# Patient Record
Sex: MALE | Race: WHITE | Employment: OTHER | URBAN - METROPOLITAN AREA
[De-identification: names, ages, dates, MRNs, and addresses within clinical notes are randomized per-mention and may not be internally consistent; named-entity substitution may affect disease eponyms.]

---

## 2023-09-01 ENCOUNTER — HOSPITAL ENCOUNTER (OUTPATIENT)
Age: 63
End: 2023-09-01
Payer: COMMERCIAL

## 2023-09-01 ENCOUNTER — HOSPITAL ENCOUNTER (OUTPATIENT)
Dept: CT IMAGING | Age: 63
Discharge: HOME | End: 2023-09-01
Payer: COMMERCIAL

## 2023-09-01 DIAGNOSIS — R41.82 ALTERED MENTAL STATUS, UNSPECIFIED ALTERED MENTAL STATUS TYPE: ICD-10-CM

## 2023-09-01 PROCEDURE — 70450 CT HEAD/BRAIN W/O DYE: CPT

## 2024-03-08 ENCOUNTER — HOSPITAL ENCOUNTER (INPATIENT)
Age: 64
LOS: 11 days | Discharge: NURSING, REHAB OR LONG-TERM CARE | DRG: 304 | End: 2024-03-19
Attending: EMERGENCY MEDICINE | Admitting: INTERNAL MEDICINE
Payer: COMMERCIAL

## 2024-03-08 ENCOUNTER — APPOINTMENT (OUTPATIENT)
Dept: GENERAL RADIOLOGY | Age: 64
DRG: 304 | End: 2024-03-08
Payer: COMMERCIAL

## 2024-03-08 DIAGNOSIS — R07.9 CHEST PAIN, UNSPECIFIED TYPE: Primary | ICD-10-CM

## 2024-03-08 LAB
ANION GAP SERPL CALCULATED.3IONS-SCNC: 11 MMOL/L (ref 3–16)
BASOPHILS # BLD: 0 K/UL (ref 0–0.2)
BASOPHILS NFR BLD: 0.4 %
BILIRUB UR QL STRIP.AUTO: NEGATIVE
BUN SERPL-MCNC: 20 MG/DL (ref 7–20)
CALCIUM SERPL-MCNC: 9.5 MG/DL (ref 8.3–10.6)
CHLORIDE SERPL-SCNC: 93 MMOL/L (ref 99–110)
CLARITY UR: CLEAR
CO2 SERPL-SCNC: 25 MMOL/L (ref 21–32)
COLOR UR: YELLOW
CREAT SERPL-MCNC: 1.9 MG/DL (ref 0.8–1.3)
DEPRECATED RDW RBC AUTO: 15.2 % (ref 12.4–15.4)
EKG ATRIAL RATE: 73 BPM
EKG DIAGNOSIS: NORMAL
EKG P AXIS: 30 DEGREES
EKG P-R INTERVAL: 194 MS
EKG Q-T INTERVAL: 444 MS
EKG QRS DURATION: 108 MS
EKG QTC CALCULATION (BAZETT): 489 MS
EKG R AXIS: -1 DEGREES
EKG T AXIS: 134 DEGREES
EKG VENTRICULAR RATE: 73 BPM
EOSINOPHIL # BLD: 0.3 K/UL (ref 0–0.6)
EOSINOPHIL NFR BLD: 3.7 %
GFR SERPLBLD CREATININE-BSD FMLA CKD-EPI: 39 ML/MIN/{1.73_M2}
GLUCOSE SERPL-MCNC: 97 MG/DL (ref 70–99)
GLUCOSE UR STRIP.AUTO-MCNC: NEGATIVE MG/DL
HCT VFR BLD AUTO: 40.3 % (ref 40.5–52.5)
HGB BLD-MCNC: 14 G/DL (ref 13.5–17.5)
HGB UR QL STRIP.AUTO: NEGATIVE
KETONES UR STRIP.AUTO-MCNC: NEGATIVE MG/DL
LEUKOCYTE ESTERASE UR QL STRIP.AUTO: NEGATIVE
LYMPHOCYTES # BLD: 1.3 K/UL (ref 1–5.1)
LYMPHOCYTES NFR BLD: 18 %
MAGNESIUM SERPL-MCNC: 1.8 MG/DL (ref 1.8–2.4)
MCH RBC QN AUTO: 29.9 PG (ref 26–34)
MCHC RBC AUTO-ENTMCNC: 34.7 G/DL (ref 31–36)
MCV RBC AUTO: 86.2 FL (ref 80–100)
MONOCYTES # BLD: 0.6 K/UL (ref 0–1.3)
MONOCYTES NFR BLD: 8.6 %
NEUTROPHILS # BLD: 5 K/UL (ref 1.7–7.7)
NEUTROPHILS NFR BLD: 69.3 %
NITRITE UR QL STRIP.AUTO: NEGATIVE
PH UR STRIP.AUTO: 6.5 [PH] (ref 5–8)
PLATELET # BLD AUTO: 270 K/UL (ref 135–450)
PMV BLD AUTO: 7.6 FL (ref 5–10.5)
POTASSIUM SERPL-SCNC: 4.4 MMOL/L (ref 3.5–5.1)
PROT UR STRIP.AUTO-MCNC: 30 MG/DL
RBC # BLD AUTO: 4.67 M/UL (ref 4.2–5.9)
RBC #/AREA URNS HPF: NORMAL /HPF (ref 0–4)
SODIUM SERPL-SCNC: 129 MMOL/L (ref 136–145)
SP GR UR STRIP.AUTO: 1.01 (ref 1–1.03)
TROPONIN, HIGH SENSITIVITY: 25 NG/L (ref 0–22)
TROPONIN, HIGH SENSITIVITY: 31 NG/L (ref 0–22)
UA COMPLETE W REFLEX CULTURE PNL UR: ABNORMAL
UA DIPSTICK W REFLEX MICRO PNL UR: YES
URN SPEC COLLECT METH UR: ABNORMAL
UROBILINOGEN UR STRIP-ACNC: 0.2 E.U./DL
WBC # BLD AUTO: 7.2 K/UL (ref 4–11)
WBC #/AREA URNS HPF: NORMAL /HPF (ref 0–5)

## 2024-03-08 PROCEDURE — 85025 COMPLETE CBC W/AUTO DIFF WBC: CPT

## 2024-03-08 PROCEDURE — 84484 ASSAY OF TROPONIN QUANT: CPT

## 2024-03-08 PROCEDURE — 83735 ASSAY OF MAGNESIUM: CPT

## 2024-03-08 PROCEDURE — 81001 URINALYSIS AUTO W/SCOPE: CPT

## 2024-03-08 PROCEDURE — 36415 COLL VENOUS BLD VENIPUNCTURE: CPT

## 2024-03-08 PROCEDURE — 6370000000 HC RX 637 (ALT 250 FOR IP): Performed by: INTERNAL MEDICINE

## 2024-03-08 PROCEDURE — 80048 BASIC METABOLIC PNL TOTAL CA: CPT

## 2024-03-08 PROCEDURE — 71045 X-RAY EXAM CHEST 1 VIEW: CPT

## 2024-03-08 PROCEDURE — 2060000000 HC ICU INTERMEDIATE R&B

## 2024-03-08 PROCEDURE — 93005 ELECTROCARDIOGRAM TRACING: CPT | Performed by: EMERGENCY MEDICINE

## 2024-03-08 PROCEDURE — 99285 EMERGENCY DEPT VISIT HI MDM: CPT

## 2024-03-08 RX ORDER — VALSARTAN 160 MG/1
160 TABLET ORAL DAILY
Status: DISCONTINUED | OUTPATIENT
Start: 2024-03-09 | End: 2024-03-09

## 2024-03-08 RX ORDER — ACETAMINOPHEN 650 MG/1
650 SUPPOSITORY RECTAL EVERY 6 HOURS PRN
Status: DISCONTINUED | OUTPATIENT
Start: 2024-03-08 | End: 2024-03-19 | Stop reason: HOSPADM

## 2024-03-08 RX ORDER — LAMOTRIGINE 25 MG/1
TABLET ORAL
Status: ON HOLD | COMMUNITY
Start: 2024-03-18 | End: 2024-03-19 | Stop reason: HOSPADM

## 2024-03-08 RX ORDER — LAMOTRIGINE 25 MG/1
50 TABLET ORAL EVERY MORNING
Status: COMPLETED | OUTPATIENT
Start: 2024-03-09 | End: 2024-03-10

## 2024-03-08 RX ORDER — MAGNESIUM SULFATE IN WATER 40 MG/ML
2000 INJECTION, SOLUTION INTRAVENOUS PRN
Status: DISCONTINUED | OUTPATIENT
Start: 2024-03-08 | End: 2024-03-19 | Stop reason: HOSPADM

## 2024-03-08 RX ORDER — CLONIDINE 0.3 MG/24H
1 PATCH, EXTENDED RELEASE TRANSDERMAL WEEKLY
Status: ON HOLD | COMMUNITY
End: 2024-03-19 | Stop reason: HOSPADM

## 2024-03-08 RX ORDER — LAMOTRIGINE 25 MG/1
100 TABLET ORAL 2 TIMES DAILY
COMMUNITY
Start: 2024-03-25

## 2024-03-08 RX ORDER — AMLODIPINE BESYLATE 10 MG/1
10 TABLET ORAL DAILY
Status: DISCONTINUED | OUTPATIENT
Start: 2024-03-09 | End: 2024-03-11

## 2024-03-08 RX ORDER — ENOXAPARIN SODIUM 100 MG/ML
40 INJECTION SUBCUTANEOUS DAILY
Status: DISCONTINUED | OUTPATIENT
Start: 2024-03-09 | End: 2024-03-19 | Stop reason: HOSPADM

## 2024-03-08 RX ORDER — POTASSIUM CHLORIDE 20 MEQ/1
40 TABLET, EXTENDED RELEASE ORAL PRN
Status: DISCONTINUED | OUTPATIENT
Start: 2024-03-08 | End: 2024-03-19 | Stop reason: HOSPADM

## 2024-03-08 RX ORDER — GUAIFENESIN 600 MG/1
600 TABLET, EXTENDED RELEASE ORAL 2 TIMES DAILY PRN
COMMUNITY

## 2024-03-08 RX ORDER — BISACODYL 10 MG
10 SUPPOSITORY, RECTAL RECTAL DAILY PRN
Status: DISCONTINUED | OUTPATIENT
Start: 2024-03-08 | End: 2024-03-19 | Stop reason: HOSPADM

## 2024-03-08 RX ORDER — LANOLIN ALCOHOL/MO/W.PET/CERES
400 CREAM (GRAM) TOPICAL DAILY
COMMUNITY

## 2024-03-08 RX ORDER — LAMOTRIGINE 25 MG/1
75 TABLET ORAL 2 TIMES DAILY
Status: DISCONTINUED | OUTPATIENT
Start: 2024-03-11 | End: 2024-03-18

## 2024-03-08 RX ORDER — LANOLIN ALCOHOL/MO/W.PET/CERES
6 CREAM (GRAM) TOPICAL NIGHTLY PRN
COMMUNITY

## 2024-03-08 RX ORDER — LAMOTRIGINE 25 MG/1
TABLET ORAL SEE ADMIN INSTRUCTIONS
Status: ON HOLD | COMMUNITY
Start: 2024-03-04 | End: 2024-03-19 | Stop reason: HOSPADM

## 2024-03-08 RX ORDER — AMLODIPINE BESYLATE 10 MG/1
10 TABLET ORAL DAILY
Status: ON HOLD | COMMUNITY
Start: 2023-10-24 | End: 2024-03-19 | Stop reason: HOSPADM

## 2024-03-08 RX ORDER — KETOCONAZOLE 20 MG/G
CREAM TOPICAL
COMMUNITY

## 2024-03-08 RX ORDER — POLYETHYLENE GLYCOL 3350 17 G/17G
17 POWDER, FOR SOLUTION ORAL 2 TIMES DAILY PRN
COMMUNITY

## 2024-03-08 RX ORDER — LAMOTRIGINE 25 MG/1
75 TABLET ORAL 2 TIMES DAILY
Status: ON HOLD | COMMUNITY
Start: 2024-03-11 | End: 2024-03-19 | Stop reason: HOSPADM

## 2024-03-08 RX ORDER — TAMSULOSIN HYDROCHLORIDE 0.4 MG/1
0.4 CAPSULE ORAL DAILY
COMMUNITY

## 2024-03-08 RX ORDER — LORATADINE 10 MG/1
10 TABLET ORAL DAILY PRN
COMMUNITY

## 2024-03-08 RX ORDER — DULOXETIN HYDROCHLORIDE 30 MG/1
30 CAPSULE, DELAYED RELEASE ORAL DAILY
Status: DISCONTINUED | OUTPATIENT
Start: 2024-03-09 | End: 2024-03-19 | Stop reason: HOSPADM

## 2024-03-08 RX ORDER — ONDANSETRON 2 MG/ML
4 INJECTION INTRAMUSCULAR; INTRAVENOUS EVERY 6 HOURS PRN
Status: DISCONTINUED | OUTPATIENT
Start: 2024-03-08 | End: 2024-03-19 | Stop reason: HOSPADM

## 2024-03-08 RX ORDER — SODIUM CHLORIDE 9 MG/ML
INJECTION, SOLUTION INTRAVENOUS PRN
Status: DISCONTINUED | OUTPATIENT
Start: 2024-03-08 | End: 2024-03-19 | Stop reason: HOSPADM

## 2024-03-08 RX ORDER — ACETAMINOPHEN 325 MG/1
650 TABLET ORAL EVERY 4 HOURS PRN
COMMUNITY

## 2024-03-08 RX ORDER — CARVEDILOL 12.5 MG/1
12.5 TABLET ORAL 2 TIMES DAILY
COMMUNITY
Start: 2024-01-23

## 2024-03-08 RX ORDER — DULOXETIN HYDROCHLORIDE 30 MG/1
30 CAPSULE, DELAYED RELEASE ORAL DAILY
COMMUNITY

## 2024-03-08 RX ORDER — SODIUM CHLORIDE 0.9 % (FLUSH) 0.9 %
5-40 SYRINGE (ML) INJECTION EVERY 12 HOURS SCHEDULED
Status: DISCONTINUED | OUTPATIENT
Start: 2024-03-08 | End: 2024-03-19 | Stop reason: HOSPADM

## 2024-03-08 RX ORDER — DIPHENHYDRAMINE HCL 25 MG
25 TABLET ORAL DAILY PRN
COMMUNITY

## 2024-03-08 RX ORDER — POLYETHYLENE GLYCOL 3350 17 G/17G
17 POWDER, FOR SOLUTION ORAL DAILY PRN
Status: DISCONTINUED | OUTPATIENT
Start: 2024-03-08 | End: 2024-03-19 | Stop reason: HOSPADM

## 2024-03-08 RX ORDER — LOSARTAN POTASSIUM 100 MG/1
100 TABLET ORAL DAILY
Status: ON HOLD | COMMUNITY
End: 2024-03-19 | Stop reason: HOSPADM

## 2024-03-08 RX ORDER — SODIUM CHLORIDE 9 MG/ML
INJECTION, SOLUTION INTRAVENOUS CONTINUOUS
Status: DISCONTINUED | OUTPATIENT
Start: 2024-03-08 | End: 2024-03-09

## 2024-03-08 RX ORDER — HYDRALAZINE HYDROCHLORIDE 20 MG/ML
10 INJECTION INTRAMUSCULAR; INTRAVENOUS EVERY 6 HOURS PRN
Status: DISCONTINUED | OUTPATIENT
Start: 2024-03-08 | End: 2024-03-09

## 2024-03-08 RX ORDER — OXCARBAZEPINE 300 MG/1
300 TABLET, FILM COATED ORAL 2 TIMES DAILY
Status: ON HOLD | COMMUNITY
End: 2024-03-19 | Stop reason: HOSPADM

## 2024-03-08 RX ORDER — ACETAMINOPHEN 325 MG/1
650 TABLET ORAL EVERY 6 HOURS PRN
Status: DISCONTINUED | OUTPATIENT
Start: 2024-03-08 | End: 2024-03-19 | Stop reason: HOSPADM

## 2024-03-08 RX ORDER — SENNA AND DOCUSATE SODIUM 50; 8.6 MG/1; MG/1
2 TABLET, FILM COATED ORAL NIGHTLY
COMMUNITY

## 2024-03-08 RX ORDER — ALBUTEROL SULFATE 90 UG/1
2 AEROSOL, METERED RESPIRATORY (INHALATION) EVERY 6 HOURS PRN
COMMUNITY
Start: 2023-11-24

## 2024-03-08 RX ORDER — THIAMINE MONONITRATE (VIT B1) 100 MG
100 TABLET ORAL DAILY
COMMUNITY

## 2024-03-08 RX ORDER — ERGOCALCIFEROL 1.25 MG/1
50000 CAPSULE ORAL WEEKLY
COMMUNITY

## 2024-03-08 RX ORDER — BISACODYL 10 MG
10 SUPPOSITORY, RECTAL RECTAL DAILY PRN
COMMUNITY
Start: 2024-02-16 | End: 2024-03-08

## 2024-03-08 RX ORDER — LAMOTRIGINE 25 MG/1
75 TABLET ORAL NIGHTLY
Status: DISPENSED | OUTPATIENT
Start: 2024-03-08 | End: 2024-03-11

## 2024-03-08 RX ORDER — ALBUTEROL SULFATE 2.5 MG/3ML
2.5 SOLUTION RESPIRATORY (INHALATION) EVERY 4 HOURS PRN
Status: DISCONTINUED | OUTPATIENT
Start: 2024-03-08 | End: 2024-03-19 | Stop reason: HOSPADM

## 2024-03-08 RX ORDER — ONDANSETRON 4 MG/1
4 TABLET, ORALLY DISINTEGRATING ORAL EVERY 8 HOURS PRN
Status: DISCONTINUED | OUTPATIENT
Start: 2024-03-08 | End: 2024-03-19 | Stop reason: HOSPADM

## 2024-03-08 RX ORDER — POTASSIUM CHLORIDE 7.45 MG/ML
10 INJECTION INTRAVENOUS PRN
Status: DISCONTINUED | OUTPATIENT
Start: 2024-03-08 | End: 2024-03-19 | Stop reason: HOSPADM

## 2024-03-08 RX ORDER — CARVEDILOL 12.5 MG/1
12.5 TABLET ORAL 2 TIMES DAILY
Status: DISCONTINUED | OUTPATIENT
Start: 2024-03-08 | End: 2024-03-09

## 2024-03-08 RX ORDER — CEPHALEXIN 250 MG/1
250 CAPSULE ORAL DAILY
Status: ON HOLD | COMMUNITY
Start: 2024-03-05 | End: 2024-03-19 | Stop reason: HOSPADM

## 2024-03-08 RX ORDER — FERROUS FUMARATE 324(106)MG
1 TABLET ORAL DAILY
COMMUNITY

## 2024-03-08 RX ORDER — SODIUM CHLORIDE 0.9 % (FLUSH) 0.9 %
5-40 SYRINGE (ML) INJECTION PRN
Status: DISCONTINUED | OUTPATIENT
Start: 2024-03-08 | End: 2024-03-19 | Stop reason: HOSPADM

## 2024-03-08 RX ADMIN — LAMOTRIGINE 75 MG: 25 TABLET ORAL at 23:44

## 2024-03-08 RX ADMIN — CARVEDILOL 12.5 MG: 12.5 TABLET, FILM COATED ORAL at 23:45

## 2024-03-08 ASSESSMENT — PAIN DESCRIPTION - ORIENTATION
ORIENTATION: MID
ORIENTATION: MID

## 2024-03-08 ASSESSMENT — PAIN DESCRIPTION - LOCATION
LOCATION: CHEST
LOCATION: CHEST

## 2024-03-08 ASSESSMENT — PAIN SCALES - GENERAL
PAINLEVEL_OUTOF10: 4
PAINLEVEL_OUTOF10: 6
PAINLEVEL_OUTOF10: 0

## 2024-03-08 ASSESSMENT — PAIN DESCRIPTION - DESCRIPTORS: DESCRIPTORS: ACHING

## 2024-03-08 NOTE — H&P
more flushed today with his face being red than usual, he had a systolic blood pressure at times up to 170 today and she had noticed recently that there is a good 20 point difference between his left and right upper extremity when checking his blood pressure.  Seen patient has been eating and drinking okay.  He denied having any nausea no vomiting no worsening of the weakness in the right upper extremity/lower extremity.  No urinary symptoms.    Patient is a full code.    According to notes from [facility] patient is allergic to Eliquis, aspirin, and therapeutic heparin due to concern of CAA but able to take heparin for DVT prophylaxis.    Also his list from the nursing home for acute medication need to be verified.    Assessment/Plan:      Current Principal Problem:  Chest pain    1.  Chest pain questionable if is related to worsening blood pressure, according to the wife supposed to be followed with cardiology as an outpatient, Blood pressure still mildly elevated so we will proceed with resuming home medication, troponin was 30 1 repeat was 25, patient symptoms has resolved by the time he was evaluated.  Will consult cardiology for evaluation as he may benefit from stress test but would like to control blood pressure first.  2.  Hypertension which continues to be an issue as blood pressure continue to be elevated continue with Coreg 12.5 mg oral twice a day, Norvasc 10 mg daily, patient is on clonidine patch 0.3 mg once a week, also on valsartan 160 mg daily.  3.  Benign prostate hypertrophy continue with Flomax 0.4 mg daily.  4.  Focal seizure patient is supposed to be on Lamictal which she is supposed to take at 50 mg every morning and 75 mg nightly through 3/10/2024 then he is to be increased to 75 mg oral twice a day through 3/17/2024, THEN 75 mg qAM and 100 mg qHS through 3/24/24; THEN switch to 100 mg tab BID starting 3/25/24.  According to the discharge instruction from his recent hospitalization at

## 2024-03-08 NOTE — ED PROVIDER NOTES
THE [facility]  EMERGENCY DEPARTMENT ENCOUNTER          EM RESIDENT NOTE       Date of evaluation: 3/8/2024    Chief Complaint     Chest Pain (CP started this morning, elevated BP. @ baseline mental status )      History of Present Illness     Joe Wood is a 64 y.o. male with story of atraumatic ICH with right-sided residual deficits, subsequent seizure disorder, atrial fibrillation, recurrent UTI who presents with chest pain from assisted living facility.    Patient presents with concern of central chest pain that began this morning while completing physical therapy exercises.  He states his pain is substernal in location and has not radiated since onset but has been constant.  He describes the pain as sharp and is not associate with shortness of breath, nausea, vomiting, lightheadedness, or any other significant symptoms.  He denies any new weakness, numbness, or tingling to any of his extremities and has not had any recent cough, congestion, infectious symptoms.  He has not taken any medications for symptoms since onset today.  Patient was reportedly hypertensive to the 200s/100s just prior to transport with EMS.  He rates his pain currently at a 6/10 in severity.    MEDICAL DECISION MAKING / ASSESSMENT / PLAN     INITIAL VITALS: BP: (!) 188/109, Temp: 97.8 °F (36.6 °C), Pulse: 82, Respirations: 18, SpO2: 98 %    Joe Wood is a 64 y.o. male with history of significant cardiovascular disease presenting for substernal chest pain.      No acute distress on initial evaluation and cardiopulmonary exam is reassuring without tachycardia or hypotension. Pulses equal and strong throughout extremities.  His examination is reassuring and his initial workup is also reassuring against acute ischemia.  His EKG demonstrates lateral T wave inversions which are persistent from previous EKGs and have no other signs of acute ischemia.  Additionally given the patient's presentation I have low concern for PE or

## 2024-03-08 NOTE — ED PROVIDER NOTES
ED Attending Attestation Note     Date of evaluation: 3/8/2024    This patient was seen by the resident.  I have seen and examined the patient, agree with the workup, evaluation, management and diagnosis. The care plan has been discussed.  I have reviewed the ECG and concur with the resident's interpretation.  My assessment reveals awake alert male who apparently had chest pain earlier today.  Currently is not present.  Not short of breath not pleuritic in nature he is awake alert no respiratory distress no tenderness in the chest wall.      Rubin Avila MD  03/08/24 6727

## 2024-03-08 NOTE — ED NOTES
Patient BIBA from [facility] for chest pain.  Patient reports that chest pain started this morning during exercise.  PIV placed using aseptic technique per hospital policy.  Blood collected and sent to lab.  EKG completed and patient placed on monitor     Aida Hahn RN  03/08/24 6639

## 2024-03-08 NOTE — ED NOTES
Patient cleaned of urine and stool then placed on PureWick.       Aida Hahn, SABINO  03/08/24 8474

## 2024-03-09 ENCOUNTER — APPOINTMENT (OUTPATIENT)
Dept: GENERAL RADIOLOGY | Age: 64
DRG: 304 | End: 2024-03-09
Payer: COMMERCIAL

## 2024-03-09 LAB
ALBUMIN SERPL-MCNC: 4.1 G/DL (ref 3.4–5)
ALBUMIN/GLOB SERPL: 1.2 {RATIO} (ref 1.1–2.2)
ALP SERPL-CCNC: 77 U/L (ref 40–129)
ALT SERPL-CCNC: 14 U/L (ref 10–40)
ANION GAP SERPL CALCULATED.3IONS-SCNC: 12 MMOL/L (ref 3–16)
ANION GAP SERPL CALCULATED.3IONS-SCNC: 13 MMOL/L (ref 3–16)
AST SERPL-CCNC: 15 U/L (ref 15–37)
BASOPHILS # BLD: 0 K/UL (ref 0–0.2)
BASOPHILS NFR BLD: 0.3 %
BILIRUB SERPL-MCNC: 0.4 MG/DL (ref 0–1)
BILIRUB UR QL STRIP.AUTO: NEGATIVE
BUN SERPL-MCNC: 18 MG/DL (ref 7–20)
BUN SERPL-MCNC: 19 MG/DL (ref 7–20)
CALCIUM SERPL-MCNC: 9.3 MG/DL (ref 8.3–10.6)
CALCIUM SERPL-MCNC: 9.4 MG/DL (ref 8.3–10.6)
CHLORIDE SERPL-SCNC: 93 MMOL/L (ref 99–110)
CHLORIDE SERPL-SCNC: 96 MMOL/L (ref 99–110)
CLARITY UR: CLEAR
CO2 SERPL-SCNC: 24 MMOL/L (ref 21–32)
CO2 SERPL-SCNC: 25 MMOL/L (ref 21–32)
COLOR UR: YELLOW
CREAT SERPL-MCNC: 1.9 MG/DL (ref 0.8–1.3)
CREAT SERPL-MCNC: 1.9 MG/DL (ref 0.8–1.3)
DEPRECATED RDW RBC AUTO: 14.8 % (ref 12.4–15.4)
DEPRECATED RDW RBC AUTO: 15.1 % (ref 12.4–15.4)
EOSINOPHIL # BLD: 0.3 K/UL (ref 0–0.6)
EOSINOPHIL NFR BLD: 3.7 %
GFR SERPLBLD CREATININE-BSD FMLA CKD-EPI: 39 ML/MIN/{1.73_M2}
GFR SERPLBLD CREATININE-BSD FMLA CKD-EPI: 39 ML/MIN/{1.73_M2}
GLUCOSE BLD-MCNC: 106 MG/DL (ref 70–99)
GLUCOSE SERPL-MCNC: 101 MG/DL (ref 70–99)
GLUCOSE SERPL-MCNC: 107 MG/DL (ref 70–99)
GLUCOSE UR STRIP.AUTO-MCNC: NEGATIVE MG/DL
HCT VFR BLD AUTO: 39.6 % (ref 40.5–52.5)
HCT VFR BLD AUTO: 43.5 % (ref 40.5–52.5)
HGB BLD-MCNC: 13.6 G/DL (ref 13.5–17.5)
HGB BLD-MCNC: 14.5 G/DL (ref 13.5–17.5)
HGB UR QL STRIP.AUTO: NEGATIVE
KETONES UR STRIP.AUTO-MCNC: NEGATIVE MG/DL
LEUKOCYTE ESTERASE UR QL STRIP.AUTO: NEGATIVE
LYMPHOCYTES # BLD: 1.2 K/UL (ref 1–5.1)
LYMPHOCYTES NFR BLD: 17.4 %
MCH RBC QN AUTO: 28.9 PG (ref 26–34)
MCH RBC QN AUTO: 29.5 PG (ref 26–34)
MCHC RBC AUTO-ENTMCNC: 33.4 G/DL (ref 31–36)
MCHC RBC AUTO-ENTMCNC: 34.3 G/DL (ref 31–36)
MCV RBC AUTO: 85.9 FL (ref 80–100)
MCV RBC AUTO: 86.5 FL (ref 80–100)
MONOCYTES # BLD: 0.6 K/UL (ref 0–1.3)
MONOCYTES NFR BLD: 8.9 %
NEUTROPHILS # BLD: 5 K/UL (ref 1.7–7.7)
NEUTROPHILS NFR BLD: 69.7 %
NITRITE UR QL STRIP.AUTO: NEGATIVE
PERFORMED ON: ABNORMAL
PH UR STRIP.AUTO: 7.5 [PH] (ref 5–8)
PLATELET # BLD AUTO: 257 K/UL (ref 135–450)
PLATELET # BLD AUTO: 282 K/UL (ref 135–450)
PMV BLD AUTO: 7.6 FL (ref 5–10.5)
PMV BLD AUTO: 7.7 FL (ref 5–10.5)
POTASSIUM SERPL-SCNC: 4.2 MMOL/L (ref 3.5–5.1)
POTASSIUM SERPL-SCNC: 4.6 MMOL/L (ref 3.5–5.1)
PROCALCITONIN SERPL IA-MCNC: 0.07 NG/ML (ref 0–0.15)
PROT SERPL-MCNC: 7.5 G/DL (ref 6.4–8.2)
PROT UR STRIP.AUTO-MCNC: 30 MG/DL
RBC # BLD AUTO: 4.61 M/UL (ref 4.2–5.9)
RBC # BLD AUTO: 5.03 M/UL (ref 4.2–5.9)
RBC #/AREA URNS HPF: NORMAL /HPF (ref 0–4)
SODIUM SERPL-SCNC: 130 MMOL/L (ref 136–145)
SODIUM SERPL-SCNC: 133 MMOL/L (ref 136–145)
SP GR UR STRIP.AUTO: 1.01 (ref 1–1.03)
UA COMPLETE W REFLEX CULTURE PNL UR: ABNORMAL
UA DIPSTICK W REFLEX MICRO PNL UR: YES
URN SPEC COLLECT METH UR: ABNORMAL
UROBILINOGEN UR STRIP-ACNC: 0.2 E.U./DL
WBC # BLD AUTO: 7.2 K/UL (ref 4–11)
WBC # BLD AUTO: 8.9 K/UL (ref 4–11)
WBC #/AREA URNS HPF: NORMAL /HPF (ref 0–5)

## 2024-03-09 PROCEDURE — 94640 AIRWAY INHALATION TREATMENT: CPT

## 2024-03-09 PROCEDURE — 6360000002 HC RX W HCPCS: Performed by: NURSE PRACTITIONER

## 2024-03-09 PROCEDURE — 85027 COMPLETE CBC AUTOMATED: CPT

## 2024-03-09 PROCEDURE — 36415 COLL VENOUS BLD VENIPUNCTURE: CPT

## 2024-03-09 PROCEDURE — 6360000002 HC RX W HCPCS: Performed by: INTERNAL MEDICINE

## 2024-03-09 PROCEDURE — 6370000000 HC RX 637 (ALT 250 FOR IP): Performed by: INTERNAL MEDICINE

## 2024-03-09 PROCEDURE — 99223 1ST HOSP IP/OBS HIGH 75: CPT | Performed by: INTERNAL MEDICINE

## 2024-03-09 PROCEDURE — 85025 COMPLETE CBC W/AUTO DIFF WBC: CPT

## 2024-03-09 PROCEDURE — 84145 PROCALCITONIN (PCT): CPT

## 2024-03-09 PROCEDURE — 2060000000 HC ICU INTERMEDIATE R&B

## 2024-03-09 PROCEDURE — 81001 URINALYSIS AUTO W/SCOPE: CPT

## 2024-03-09 PROCEDURE — 80053 COMPREHEN METABOLIC PANEL: CPT

## 2024-03-09 PROCEDURE — 71045 X-RAY EXAM CHEST 1 VIEW: CPT

## 2024-03-09 PROCEDURE — 74018 RADEX ABDOMEN 1 VIEW: CPT

## 2024-03-09 PROCEDURE — 6370000000 HC RX 637 (ALT 250 FOR IP): Performed by: STUDENT IN AN ORGANIZED HEALTH CARE EDUCATION/TRAINING PROGRAM

## 2024-03-09 PROCEDURE — 2580000003 HC RX 258: Performed by: INTERNAL MEDICINE

## 2024-03-09 RX ORDER — VALSARTAN 320 MG/1
320 TABLET ORAL DAILY
Status: DISCONTINUED | OUTPATIENT
Start: 2024-03-10 | End: 2024-03-11

## 2024-03-09 RX ORDER — VALSARTAN 160 MG/1
160 TABLET ORAL ONCE
Status: COMPLETED | OUTPATIENT
Start: 2024-03-09 | End: 2024-03-09

## 2024-03-09 RX ORDER — HYDRALAZINE HYDROCHLORIDE 20 MG/ML
20 INJECTION INTRAMUSCULAR; INTRAVENOUS EVERY 4 HOURS PRN
Status: DISCONTINUED | OUTPATIENT
Start: 2024-03-09 | End: 2024-03-19 | Stop reason: HOSPADM

## 2024-03-09 RX ORDER — CARVEDILOL 25 MG/1
25 TABLET ORAL 2 TIMES DAILY
Status: DISCONTINUED | OUTPATIENT
Start: 2024-03-09 | End: 2024-03-19 | Stop reason: HOSPADM

## 2024-03-09 RX ORDER — CLONIDINE 0.2 MG/24H
1 PATCH, EXTENDED RELEASE TRANSDERMAL WEEKLY
Status: DISCONTINUED | OUTPATIENT
Start: 2024-03-09 | End: 2024-03-19 | Stop reason: HOSPADM

## 2024-03-09 RX ORDER — PROMETHAZINE HYDROCHLORIDE 25 MG/ML
6.25 INJECTION, SOLUTION INTRAMUSCULAR; INTRAVENOUS ONCE
Status: COMPLETED | OUTPATIENT
Start: 2024-03-09 | End: 2024-03-09

## 2024-03-09 RX ADMIN — HYDRALAZINE HYDROCHLORIDE 10 MG: 20 INJECTION INTRAMUSCULAR; INTRAVENOUS at 12:46

## 2024-03-09 RX ADMIN — HYDRALAZINE HYDROCHLORIDE 20 MG: 20 INJECTION INTRAMUSCULAR; INTRAVENOUS at 17:42

## 2024-03-09 RX ADMIN — PROMETHAZINE HYDROCHLORIDE 6.25 MG: 25 INJECTION INTRAMUSCULAR; INTRAVENOUS at 19:00

## 2024-03-09 RX ADMIN — POLYETHYLENE GLYCOL 3350 17 G: 17 POWDER, FOR SOLUTION ORAL at 09:07

## 2024-03-09 RX ADMIN — SODIUM CHLORIDE, PRESERVATIVE FREE 10 ML: 5 INJECTION INTRAVENOUS at 20:12

## 2024-03-09 RX ADMIN — DULOXETINE HYDROCHLORIDE 30 MG: 30 CAPSULE, DELAYED RELEASE ORAL at 08:26

## 2024-03-09 RX ADMIN — VALSARTAN 160 MG: 160 TABLET, FILM COATED ORAL at 08:26

## 2024-03-09 RX ADMIN — CARVEDILOL 12.5 MG: 12.5 TABLET, FILM COATED ORAL at 08:26

## 2024-03-09 RX ADMIN — ENOXAPARIN SODIUM 40 MG: 100 INJECTION SUBCUTANEOUS at 08:25

## 2024-03-09 RX ADMIN — VALSARTAN 160 MG: 160 TABLET, FILM COATED ORAL at 13:15

## 2024-03-09 RX ADMIN — SODIUM CHLORIDE, PRESERVATIVE FREE 10 ML: 5 INJECTION INTRAVENOUS at 00:46

## 2024-03-09 RX ADMIN — ONDANSETRON 4 MG: 2 INJECTION INTRAMUSCULAR; INTRAVENOUS at 12:46

## 2024-03-09 RX ADMIN — AMLODIPINE BESYLATE 10 MG: 10 TABLET ORAL at 08:26

## 2024-03-09 RX ADMIN — VALSARTAN 160 MG: 160 TABLET, FILM COATED ORAL at 10:30

## 2024-03-09 RX ADMIN — ALBUTEROL SULFATE 2.5 MG: 2.5 SOLUTION RESPIRATORY (INHALATION) at 00:07

## 2024-03-09 RX ADMIN — LAMOTRIGINE 50 MG: 25 TABLET ORAL at 08:26

## 2024-03-09 RX ADMIN — SODIUM CHLORIDE: 9 INJECTION, SOLUTION INTRAVENOUS at 00:45

## 2024-03-09 ASSESSMENT — PAIN - FUNCTIONAL ASSESSMENT: PAIN_FUNCTIONAL_ASSESSMENT: PREVENTS OR INTERFERES WITH ALL ACTIVE AND SOME PASSIVE ACTIVITIES

## 2024-03-09 ASSESSMENT — PAIN SCALES - GENERAL
PAINLEVEL_OUTOF10: 0
PAINLEVEL_OUTOF10: 10
PAINLEVEL_OUTOF10: 0
PAINLEVEL_OUTOF10: 0

## 2024-03-09 ASSESSMENT — PAIN DESCRIPTION - FREQUENCY: FREQUENCY: CONTINUOUS

## 2024-03-09 ASSESSMENT — PAIN DESCRIPTION - LOCATION: LOCATION: ABDOMEN

## 2024-03-09 ASSESSMENT — PAIN DESCRIPTION - PAIN TYPE: TYPE: ACUTE PAIN

## 2024-03-09 ASSESSMENT — PAIN DESCRIPTION - ONSET: ONSET: ON-GOING

## 2024-03-09 ASSESSMENT — PAIN DESCRIPTION - ORIENTATION: ORIENTATION: ANTERIOR

## 2024-03-09 NOTE — ED NOTES
ED TO INPATIENT SBAR HANDOFF    Patient Name: Joe Wood   :  1960  64 y.o.   MRN:  [id number]  Preferred Name  Shaniko  ED Room #:  A09/A09-09  Family/Caregiver Present no   Restraints no   Sitter no   Sepsis Risk Score Sepsis Risk Score: 0.53    Situation  Code Status: No Order No additional code details.    Allergies: Patient has no allergy information on record.  Weight: Patient Vitals for the past 96 hrs (Last 3 readings):   Weight   24 1600 84.2 kg (185 lb 9.6 oz)     Arrived from: nursing home  Chief Complaint:   Chief Complaint   Patient presents with    Chest Pain     CP started this morning, elevated BP. @ baseline mental status      Hospital Problem/Diagnosis:  Principal Problem:    Chest pain  Resolved Problems:    * No resolved hospital problems. *    Imaging:   XR CHEST PORTABLE   Final Result      1.  No acute disease.           Abnormal labs:   Abnormal Labs Reviewed   CBC WITH AUTO DIFFERENTIAL - Abnormal; Notable for the following components:       Result Value    Hematocrit 40.3 (*)     All other components within normal limits   BASIC METABOLIC PANEL W/ REFLEX TO MG FOR LOW K - Abnormal; Notable for the following components:    Sodium 129 (*)     Chloride 93 (*)     Creatinine 1.9 (*)     Est, Glom Filt Rate 39 (*)     All other components within normal limits   TROPONIN - Abnormal; Notable for the following components:    Troponin, High Sensitivity 31 (*)     All other components within normal limits   TROPONIN - Abnormal; Notable for the following components:    Troponin, High Sensitivity 25 (*)     All other components within normal limits   URINALYSIS WITH REFLEX TO CULTURE - Abnormal; Notable for the following components:    Protein, UA 30 (*)     All other components within normal limits     Critical values: no     Abnormal Assessment Findings: Patient hypertensive 170's.  Complains of mid sternal chest pain.  Flushed face    Background  History: History reviewed. No pertinent

## 2024-03-09 NOTE — CONSULTS
[facility]  Cardiology Inpatient Consult Service                                                                                          Pt Name: Joe Wood  Age: 64 y.o.  Sex: male  : 1960  Location: [address] [place], [place] [address]    Referring Physician: Paul Velasquez MD      Reason for Consult:       Reason for Consultation/Chief Complaint:   Chest pain/uncontrolled hypertension      HPI:      Joe Wood is a 64 y.o. male with complex past history including recent stroke/hemorrhagic conversion complicated with seizure disorder, uncontrolled hypertension who presented to hospital with chest discomfort.  It seems that last outpatient they have been struggling to tailor his BP meds.  His EKG does not show any ischemic changes, does have LVH with repolarization changes, troponins currently trending down    Histories     Past Medical History:   has no past medical history on file.    Surgical History:   has no past surgical history on file.     Social History:   reports that he has never smoked. He has never used smokeless tobacco.     Family History:  No evidence for sudden cardiac death or premature CAD      Medications:       Home Medications  Were reviewed and are listed in nursing record. and/or listed below  Prior to Admission medications    Medication Sig Start Date End Date Taking? Authorizing Provider   Cyanocobalamin 1000 MCG/ML KIT Inject 1,000 mcg into the muscle every 30 days 10/24/23  Yes Provider, MD Mere   albuterol sulfate HFA (PROVENTIL;VENTOLIN;PROAIR) 108 (90 Base) MCG/ACT inhaler Inhale 2 puffs into the lungs every 6 hours as needed for Wheezing 23  Yes Provider, MD Mere   carvedilol (COREG) 12.5 MG tablet Take 1 tablet by mouth 2 times daily 24  Yes ProviderMere MD   amLODIPine (NORVASC) 10 MG tablet Take 1 tablet by mouth daily 10/24/23  Yes Provider, MD Mere   acetaminophen (TYLENOL) 325 MG tablet Take 2 tablets by

## 2024-03-09 NOTE — CONSULTS
Ph: [phone], Fax: [phone]                                     [facility]                                                   [address] [place], [place] [id number]           Reason for admission:    Chest pain           Brief Summary:     Joe Wood is being seen by nephrology for CKD and HTN    Interval History and Plan:   Patient seen at bedside  Comfortable  /95  On room air      Reviewed cardiologist recommendations. Agree. Increase ARB dose.   Follow BP  Given elevated BP, D/C IV normal saline.   Neurology consulted for ? Confusion from his baseline per his wife.   UA neg for UTI  Increase protein in diet  Avoid NSAIDs/Nephrotoxins            Thank you for allowing us to participate in this patient's care  In case of any question please call us at our 24 hour answering service [phone] or from 7 AM to 5 PM via Perfect Serve, Voalte, Epic chat or cell phone.   Dr Chau Anders MD      Assessment:     Chronic Kidney Disease  Cr around baseline      Mild Hyponatremia      Hypertension:      Chest pain      H/O CVA        HPI      Patient is a 64 y.o. 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       Patient seen at bedside with his wife and appear comfortable. Unable to speak properly due to H/O CVA. Chest pain is better. Denied SOB and on room air. Per wife patient appear little confused ? And he has recurrent UTIs and whenever he has UTI he is more confused.         ROS:   Negative except as mentioned in HPI      Vitals:     Vitals:    03/09/24 0855   BP: (!) 179/95   Pulse: 80   Resp: 18   Temp: 98.1 °F (36.7 °C)   SpO2:          Physical Examination:     General appearance: NAD. Alert,

## 2024-03-10 LAB
ALBUMIN SERPL-MCNC: 4 G/DL (ref 3.4–5)
ALBUMIN/GLOB SERPL: 1.3 {RATIO} (ref 1.1–2.2)
ALP SERPL-CCNC: 75 U/L (ref 40–129)
ALT SERPL-CCNC: 12 U/L (ref 10–40)
ANION GAP SERPL CALCULATED.3IONS-SCNC: 11 MMOL/L (ref 3–16)
AST SERPL-CCNC: 15 U/L (ref 15–37)
BILIRUB SERPL-MCNC: 0.6 MG/DL (ref 0–1)
BUN SERPL-MCNC: 19 MG/DL (ref 7–20)
CALCIUM SERPL-MCNC: 9.4 MG/DL (ref 8.3–10.6)
CHLORIDE SERPL-SCNC: 97 MMOL/L (ref 99–110)
CO2 SERPL-SCNC: 25 MMOL/L (ref 21–32)
CREAT SERPL-MCNC: 2.2 MG/DL (ref 0.8–1.3)
D DIMER: 0.4 UG/ML FEU (ref 0–0.6)
FLUAV RNA UPPER RESP QL NAA+PROBE: NEGATIVE
FLUBV AG NPH QL: NEGATIVE
GFR SERPLBLD CREATININE-BSD FMLA CKD-EPI: 33 ML/MIN/{1.73_M2}
GLUCOSE SERPL-MCNC: 105 MG/DL (ref 70–99)
POTASSIUM SERPL-SCNC: 4.4 MMOL/L (ref 3.5–5.1)
PROT SERPL-MCNC: 7.1 G/DL (ref 6.4–8.2)
SARS-COV-2 RDRP RESP QL NAA+PROBE: NOT DETECTED
SODIUM SERPL-SCNC: 133 MMOL/L (ref 136–145)

## 2024-03-10 PROCEDURE — 6370000000 HC RX 637 (ALT 250 FOR IP): Performed by: STUDENT IN AN ORGANIZED HEALTH CARE EDUCATION/TRAINING PROGRAM

## 2024-03-10 PROCEDURE — 87804 INFLUENZA ASSAY W/OPTIC: CPT

## 2024-03-10 PROCEDURE — 80053 COMPREHEN METABOLIC PANEL: CPT

## 2024-03-10 PROCEDURE — 6360000002 HC RX W HCPCS: Performed by: INTERNAL MEDICINE

## 2024-03-10 PROCEDURE — 92610 EVALUATE SWALLOWING FUNCTION: CPT

## 2024-03-10 PROCEDURE — 6370000000 HC RX 637 (ALT 250 FOR IP): Performed by: INTERNAL MEDICINE

## 2024-03-10 PROCEDURE — 85379 FIBRIN DEGRADATION QUANT: CPT

## 2024-03-10 PROCEDURE — 2580000003 HC RX 258: Performed by: INTERNAL MEDICINE

## 2024-03-10 PROCEDURE — 87635 SARS-COV-2 COVID-19 AMP PRB: CPT

## 2024-03-10 PROCEDURE — 2060000000 HC ICU INTERMEDIATE R&B

## 2024-03-10 PROCEDURE — 92526 ORAL FUNCTION THERAPY: CPT

## 2024-03-10 PROCEDURE — 99232 SBSQ HOSP IP/OBS MODERATE 35: CPT | Performed by: INTERNAL MEDICINE

## 2024-03-10 PROCEDURE — 36415 COLL VENOUS BLD VENIPUNCTURE: CPT

## 2024-03-10 RX ORDER — SODIUM CHLORIDE 9 MG/ML
INJECTION, SOLUTION INTRAVENOUS CONTINUOUS
Status: DISCONTINUED | OUTPATIENT
Start: 2024-03-10 | End: 2024-03-10

## 2024-03-10 RX ADMIN — SODIUM CHLORIDE, PRESERVATIVE FREE 10 ML: 5 INJECTION INTRAVENOUS at 20:47

## 2024-03-10 RX ADMIN — ONDANSETRON 4 MG: 4 TABLET, ORALLY DISINTEGRATING ORAL at 08:38

## 2024-03-10 RX ADMIN — POLYETHYLENE GLYCOL 3350 17 G: 17 POWDER, FOR SOLUTION ORAL at 11:25

## 2024-03-10 RX ADMIN — AMLODIPINE BESYLATE 10 MG: 10 TABLET ORAL at 08:39

## 2024-03-10 RX ADMIN — ENOXAPARIN SODIUM 40 MG: 100 INJECTION SUBCUTANEOUS at 08:38

## 2024-03-10 RX ADMIN — SODIUM CHLORIDE, PRESERVATIVE FREE 10 ML: 5 INJECTION INTRAVENOUS at 08:38

## 2024-03-10 RX ADMIN — LAMOTRIGINE 50 MG: 25 TABLET ORAL at 08:39

## 2024-03-10 RX ADMIN — DULOXETINE HYDROCHLORIDE 30 MG: 30 CAPSULE, DELAYED RELEASE ORAL at 08:38

## 2024-03-10 RX ADMIN — CARVEDILOL 25 MG: 25 TABLET, FILM COATED ORAL at 20:46

## 2024-03-10 RX ADMIN — VALSARTAN 320 MG: 320 TABLET ORAL at 08:38

## 2024-03-10 RX ADMIN — Medication 30 G: at 11:25

## 2024-03-10 RX ADMIN — ACETAMINOPHEN 650 MG: 325 TABLET ORAL at 13:42

## 2024-03-10 RX ADMIN — SODIUM CHLORIDE: 9 INJECTION, SOLUTION INTRAVENOUS at 06:40

## 2024-03-10 RX ADMIN — CARVEDILOL 25 MG: 25 TABLET, FILM COATED ORAL at 08:38

## 2024-03-10 RX ADMIN — LAMOTRIGINE 75 MG: 25 TABLET ORAL at 20:46

## 2024-03-10 ASSESSMENT — PAIN SCALES - GENERAL
PAINLEVEL_OUTOF10: 0

## 2024-03-11 ENCOUNTER — APPOINTMENT (OUTPATIENT)
Dept: GENERAL RADIOLOGY | Age: 64
DRG: 304 | End: 2024-03-11
Payer: COMMERCIAL

## 2024-03-11 PROBLEM — I10 UNCONTROLLED HYPERTENSION: Status: ACTIVE | Noted: 2024-03-11

## 2024-03-11 LAB
ALBUMIN SERPL-MCNC: 3.9 G/DL (ref 3.4–5)
ANION GAP SERPL CALCULATED.3IONS-SCNC: 12 MMOL/L (ref 3–16)
BUN SERPL-MCNC: 41 MG/DL (ref 7–20)
CALCIUM SERPL-MCNC: 9.1 MG/DL (ref 8.3–10.6)
CHLORIDE SERPL-SCNC: 98 MMOL/L (ref 99–110)
CO2 SERPL-SCNC: 25 MMOL/L (ref 21–32)
CREAT SERPL-MCNC: 2.4 MG/DL (ref 0.8–1.3)
DEPRECATED RDW RBC AUTO: 15.1 % (ref 12.4–15.4)
GFR SERPLBLD CREATININE-BSD FMLA CKD-EPI: 30 ML/MIN/{1.73_M2}
GLUCOSE SERPL-MCNC: 97 MG/DL (ref 70–99)
HCT VFR BLD AUTO: 40 % (ref 40.5–52.5)
HGB BLD-MCNC: 13.5 G/DL (ref 13.5–17.5)
MCH RBC QN AUTO: 29.3 PG (ref 26–34)
MCHC RBC AUTO-ENTMCNC: 33.7 G/DL (ref 31–36)
MCV RBC AUTO: 86.8 FL (ref 80–100)
PHOSPHATE SERPL-MCNC: 4.7 MG/DL (ref 2.5–4.9)
PLATELET # BLD AUTO: 211 K/UL (ref 135–450)
PMV BLD AUTO: 8.2 FL (ref 5–10.5)
POTASSIUM SERPL-SCNC: 4.7 MMOL/L (ref 3.5–5.1)
RBC # BLD AUTO: 4.6 M/UL (ref 4.2–5.9)
SODIUM SERPL-SCNC: 135 MMOL/L (ref 136–145)
WBC # BLD AUTO: 7.7 K/UL (ref 4–11)

## 2024-03-11 PROCEDURE — 92611 MOTION FLUOROSCOPY/SWALLOW: CPT

## 2024-03-11 PROCEDURE — 2580000003 HC RX 258: Performed by: INTERNAL MEDICINE

## 2024-03-11 PROCEDURE — 6370000000 HC RX 637 (ALT 250 FOR IP): Performed by: INTERNAL MEDICINE

## 2024-03-11 PROCEDURE — 2060000000 HC ICU INTERMEDIATE R&B

## 2024-03-11 PROCEDURE — 85027 COMPLETE CBC AUTOMATED: CPT

## 2024-03-11 PROCEDURE — 74230 X-RAY XM SWLNG FUNCJ C+: CPT

## 2024-03-11 PROCEDURE — 6360000002 HC RX W HCPCS: Performed by: INTERNAL MEDICINE

## 2024-03-11 PROCEDURE — 92526 ORAL FUNCTION THERAPY: CPT

## 2024-03-11 PROCEDURE — 80069 RENAL FUNCTION PANEL: CPT

## 2024-03-11 PROCEDURE — 36415 COLL VENOUS BLD VENIPUNCTURE: CPT

## 2024-03-11 PROCEDURE — 99232 SBSQ HOSP IP/OBS MODERATE 35: CPT | Performed by: INTERNAL MEDICINE

## 2024-03-11 RX ORDER — NIFEDIPINE 60 MG/1
60 TABLET, EXTENDED RELEASE ORAL DAILY
Status: DISCONTINUED | OUTPATIENT
Start: 2024-03-11 | End: 2024-03-19 | Stop reason: HOSPADM

## 2024-03-11 RX ORDER — TAMSULOSIN HYDROCHLORIDE 0.4 MG/1
0.4 CAPSULE ORAL DAILY
Status: DISCONTINUED | OUTPATIENT
Start: 2024-03-11 | End: 2024-03-19 | Stop reason: HOSPADM

## 2024-03-11 RX ADMIN — ENOXAPARIN SODIUM 40 MG: 100 INJECTION SUBCUTANEOUS at 08:31

## 2024-03-11 RX ADMIN — SODIUM CHLORIDE, PRESERVATIVE FREE 10 ML: 5 INJECTION INTRAVENOUS at 08:31

## 2024-03-11 RX ADMIN — LAMOTRIGINE 75 MG: 25 TABLET ORAL at 22:11

## 2024-03-11 RX ADMIN — NIFEDIPINE 60 MG: 60 TABLET, EXTENDED RELEASE ORAL at 08:31

## 2024-03-11 RX ADMIN — SODIUM CHLORIDE, PRESERVATIVE FREE 10 ML: 5 INJECTION INTRAVENOUS at 22:13

## 2024-03-11 RX ADMIN — CARVEDILOL 25 MG: 25 TABLET, FILM COATED ORAL at 08:31

## 2024-03-11 RX ADMIN — TAMSULOSIN HYDROCHLORIDE 0.4 MG: 0.4 CAPSULE ORAL at 13:22

## 2024-03-11 RX ADMIN — DULOXETINE HYDROCHLORIDE 30 MG: 30 CAPSULE, DELAYED RELEASE ORAL at 08:31

## 2024-03-11 RX ADMIN — POLYETHYLENE GLYCOL 3350 17 G: 17 POWDER, FOR SOLUTION ORAL at 08:38

## 2024-03-11 RX ADMIN — CARVEDILOL 25 MG: 25 TABLET, FILM COATED ORAL at 22:11

## 2024-03-11 RX ADMIN — LAMOTRIGINE 75 MG: 25 TABLET ORAL at 08:31

## 2024-03-11 ASSESSMENT — PAIN SCALES - GENERAL
PAINLEVEL_OUTOF10: 0

## 2024-03-11 NOTE — PROCEDURES
INSTRUMENTAL SWALLOW REPORT  MODIFIED BARIUM SWALLOW/Treatment    NAME: Joe Wood     : 1960  MRN: [id number]       Date of Eval: 3/11/2024     Ordering Physician: Dr. Velasquez  Referring Diagnosis: Dysphagia    Past Medical History:  has no past medical history on file.  Past Surgical History:  has no past surgical history on file.    CXR: 3/9/24  IMPRESSION:     1.  No acute disease.    Prior MBS Results: 24  \"Assessment: Patient presents with oropharyngeal dysphagia characterized by reduced hyolaryngeal elevation resulting in inconsistently incomplete epiglottic inversion and UES distention, delayed swallow initiation, poor bolus control, reduced BOT retraction, reduced pharyngeal stripping wave, and incomplete/ delayed laryngeal vestibule closure. Pt's swallow function is complicated by weak cough, reduced laryngeal sensation, and acute CVA.   This resulted in observed aspiration of thin and mildly thick liquids that was intermittently silent vs overt. Spontaneous and cued cough were not beneficial in ejecting material from the airway. Pt spontaneously ejected aspirated mildly thick liquids back into laryngeal vestibule. Chin tuck in combination with oral hold was beneficial in preventing aspiration. Pt mildly prolonged mastication with hard solids with mild oral residue. Mild pharyngeal residue noted across consistencies. Outpouching noted along posterior pharyngeal wall, consistent with beginning of diverticulum. See photo below. Recommend Soft & Bite-Sized (IDDSI 6) with Thin Liquids (IDDSI 0) with use of CHIN TUCK and ORAL HOLD; meds crushed in puree. If patient demonstrates increased coughing with intake, downgrade to mildly thick liquids. Patient remains at increased risk of aspiration.\"     Patient Complaints/Reason for Referral:  Joe Wood was referred for a MBS to assess the efficiency of his/her swallow function, assess for aspiration, and to make recommendations regarding safe

## 2024-03-11 NOTE — FLOWSHEET NOTE
03/10/24 1345   NIHSS Stroke Scale   Best Gaze (2)   (Pt has times where he looks up and to left r/t past strokes, discuss with wife and Dr Velasquez @ bedside.)

## 2024-03-11 NOTE — CARE COORDINATION
Case Management Assessment  Initial Evaluation    Date/Time of Evaluation: 3/11/2024 10:26 AM  Assessment Completed by: Claus Bills RN    If patient is discharged prior to next notation, then this note serves as note for discharge by case management.    Patient Name: Joe Wood                   YOB: 1960  Diagnosis: Chest pain [R07.9]  Chest pain, unspecified type [R07.9]                   Date / Time: 3/8/2024  3:35 PM    Patient Admission Status: Inpatient   Readmission Risk (Low < 19, Mod (19-27), High > 27): Readmission Risk Score: 14.8    Current PCP: Caesar Mujica MD  PCP verified by CM? Yes    Chart Reviewed: Yes      History Provided by: Patient, Significant Other, Medical Record  Patient Orientation: Alert and Oriented    Patient Cognition: Alert    Hospitalization in the last 30 days (Readmission):  No    If yes, Readmission Assessment in  Navigator will be completed and shown below.          Advance Directives:      Code Status: Full Code   Patient's Primary Decision Maker is: Legal Next of Kin      Discharge Planning:    Patient lives with: Other (Comment) (with wife, prior to SNF admission) Type of Home: Skilled Nursing Facility  Primary Care Giver: Self  Patient Support Systems include: Spouse/Significant Other, Family Members   Current Financial resources:    Current community resources:    Current services prior to admission: Skilled Nursing Facility            Current DME:              Type of Home Care services:  None    ADLS  Prior functional level: Assistance with the following:, Bathing, Dressing, Toileting, Cooking, Housework, Shopping, Mobility  Current functional level: Assistance with the following:, Bathing, Dressing, Toileting, Cooking, Housework, Shopping, Mobility    PT AM-PAC:   /24  OT AM-PAC:   /24    Family can provide assistance at DC: Yes  Would you like Case Management to discuss the discharge plan with any other family members/significant others, and if so,

## 2024-03-11 NOTE — DISCHARGE INSTR - COC
Continuity of Care Form    Patient Name: Joe Wood   :  1960  MRN:  [id number]    Admit date:  3/8/2024  Discharge date:  ***    Code Status Order: Full Code   Advance Directives:     Admitting Physician:  Jovanni Panda MD  PCP: Caesar Mujica MD    Discharging Nurse: ***  Discharging Hospital Unit/Room#: 4302/4302-01  Discharging Unit Phone Number: ***    Emergency Contact:   Extended Emergency Contact Information  Primary Emergency Contact: Mere Wood  Home Phone: [phone]  Relation: Spouse    Past Surgical History:  History reviewed. No pertinent surgical history.    Immunization History:     There is no immunization history on file for this patient.    Active Problems:  Patient Active Problem List   Diagnosis Code    Chest pain R07.9       Isolation/Infection:   Isolation            No Isolation          Patient Infection Status       None to display                     Nurse Assessment:  Last Vital Signs: BP (!) 171/95   Pulse 69   Temp 97.5 °F (36.4 °C) (Oral)   Resp 18   Ht 1.905 m (6' 3\")   Wt 84.8 kg (186 lb 15.2 oz)   SpO2 97%   BMI 23.37 kg/m²     Last documented pain score (0-10 scale): Pain Level: 0  Last Weight:   Wt Readings from Last 1 Encounters:   24 84.8 kg (186 lb 15.2 oz)     Mental Status:  {IP PT MENTAL STATUS:}    IV Access:  { PILLO IV ACCESS:681720188}    Nursing Mobility/ADLs:  Walking   {CHP DME ADLs:486333706}  Transfer  {CHP DME ADLs:322506337}  Bathing  {CHP DME ADLs:681644236}  Dressing  {CHP DME ADLs:230671991}  Toileting  {CHP DME ADLs:327056035}  Feeding  {CHP DME ADLs:370291528}  Med Admin  {P DME ADLs:902448655}  Med Delivery   { PILLO MED Delivery:285808053}    Wound Care Documentation and Therapy:        Elimination:  Continence:   Bowel: {YES / NO:}  Bladder: {YES / NO:}  Urinary Catheter: {Urinary Catheter:245947884}   Colostomy/Ileostomy/Ileal Conduit: {YES / NO:98140}       Date of Last BM: ***    Intake/Output Summary

## 2024-03-12 PROBLEM — R07.89 NON-CARDIAC CHEST PAIN: Status: ACTIVE | Noted: 2024-03-08

## 2024-03-12 LAB
ALBUMIN SERPL-MCNC: 4 G/DL (ref 3.4–5)
ANION GAP SERPL CALCULATED.3IONS-SCNC: 10 MMOL/L (ref 3–16)
BUN SERPL-MCNC: 37 MG/DL (ref 7–20)
CALCIUM SERPL-MCNC: 10.1 MG/DL (ref 8.3–10.6)
CHLORIDE SERPL-SCNC: 97 MMOL/L (ref 99–110)
CO2 SERPL-SCNC: 27 MMOL/L (ref 21–32)
CREAT SERPL-MCNC: 2.5 MG/DL (ref 0.8–1.3)
GFR SERPLBLD CREATININE-BSD FMLA CKD-EPI: 28 ML/MIN/{1.73_M2}
GLUCOSE SERPL-MCNC: 104 MG/DL (ref 70–99)
PHOSPHATE SERPL-MCNC: 4.2 MG/DL (ref 2.5–4.9)
POTASSIUM SERPL-SCNC: 4.8 MMOL/L (ref 3.5–5.1)
SODIUM SERPL-SCNC: 134 MMOL/L (ref 136–145)

## 2024-03-12 PROCEDURE — 97166 OT EVAL MOD COMPLEX 45 MIN: CPT

## 2024-03-12 PROCEDURE — 99233 SBSQ HOSP IP/OBS HIGH 50: CPT | Performed by: NURSE PRACTITIONER

## 2024-03-12 PROCEDURE — 97530 THERAPEUTIC ACTIVITIES: CPT

## 2024-03-12 PROCEDURE — 97162 PT EVAL MOD COMPLEX 30 MIN: CPT

## 2024-03-12 PROCEDURE — 97535 SELF CARE MNGMENT TRAINING: CPT

## 2024-03-12 PROCEDURE — 6370000000 HC RX 637 (ALT 250 FOR IP): Performed by: INTERNAL MEDICINE

## 2024-03-12 PROCEDURE — 36415 COLL VENOUS BLD VENIPUNCTURE: CPT

## 2024-03-12 PROCEDURE — 6360000002 HC RX W HCPCS: Performed by: INTERNAL MEDICINE

## 2024-03-12 PROCEDURE — 2580000003 HC RX 258: Performed by: INTERNAL MEDICINE

## 2024-03-12 PROCEDURE — 6370000000 HC RX 637 (ALT 250 FOR IP): Performed by: NURSE PRACTITIONER

## 2024-03-12 PROCEDURE — 2060000000 HC ICU INTERMEDIATE R&B

## 2024-03-12 PROCEDURE — 97116 GAIT TRAINING THERAPY: CPT

## 2024-03-12 PROCEDURE — 92526 ORAL FUNCTION THERAPY: CPT

## 2024-03-12 PROCEDURE — 80069 RENAL FUNCTION PANEL: CPT

## 2024-03-12 RX ORDER — FUROSEMIDE 10 MG/ML
40 INJECTION INTRAMUSCULAR; INTRAVENOUS ONCE
Status: COMPLETED | OUTPATIENT
Start: 2024-03-12 | End: 2024-03-12

## 2024-03-12 RX ORDER — HYDRALAZINE HYDROCHLORIDE 25 MG/1
25 TABLET, FILM COATED ORAL EVERY 8 HOURS SCHEDULED
Status: DISCONTINUED | OUTPATIENT
Start: 2024-03-12 | End: 2024-03-17

## 2024-03-12 RX ADMIN — CARVEDILOL 25 MG: 25 TABLET, FILM COATED ORAL at 09:32

## 2024-03-12 RX ADMIN — LAMOTRIGINE 75 MG: 25 TABLET ORAL at 09:32

## 2024-03-12 RX ADMIN — HYDRALAZINE HYDROCHLORIDE 25 MG: 25 TABLET ORAL at 21:10

## 2024-03-12 RX ADMIN — FUROSEMIDE 40 MG: 10 INJECTION, SOLUTION INTRAMUSCULAR; INTRAVENOUS at 09:32

## 2024-03-12 RX ADMIN — SODIUM CHLORIDE, PRESERVATIVE FREE 10 ML: 5 INJECTION INTRAVENOUS at 09:32

## 2024-03-12 RX ADMIN — DULOXETINE HYDROCHLORIDE 30 MG: 30 CAPSULE, DELAYED RELEASE ORAL at 09:32

## 2024-03-12 RX ADMIN — LAMOTRIGINE 75 MG: 25 TABLET ORAL at 21:10

## 2024-03-12 RX ADMIN — TAMSULOSIN HYDROCHLORIDE 0.4 MG: 0.4 CAPSULE ORAL at 09:33

## 2024-03-12 RX ADMIN — CARVEDILOL 25 MG: 25 TABLET, FILM COATED ORAL at 21:10

## 2024-03-12 RX ADMIN — ENOXAPARIN SODIUM 40 MG: 100 INJECTION SUBCUTANEOUS at 09:32

## 2024-03-12 RX ADMIN — HYDRALAZINE HYDROCHLORIDE 25 MG: 25 TABLET ORAL at 15:32

## 2024-03-12 RX ADMIN — NIFEDIPINE 60 MG: 60 TABLET, EXTENDED RELEASE ORAL at 09:33

## 2024-03-12 RX ADMIN — SODIUM CHLORIDE, PRESERVATIVE FREE 10 ML: 5 INJECTION INTRAVENOUS at 21:10

## 2024-03-12 ASSESSMENT — PAIN SCALES - GENERAL
PAINLEVEL_OUTOF10: 0

## 2024-03-12 NOTE — CARE COORDINATION
11:49 AM  Spoke with wife and patient at bedside; agreeable to ARU. Their first choice is [facility], second is [facility].   Referrals sent.     Electronically signed by Claus Bills RN, CM on 3/12/2024 at 11:50 AM.  Phone: [phone]  Fax: [phone]

## 2024-03-13 LAB
ALBUMIN SERPL-MCNC: 4.1 G/DL (ref 3.4–5)
ANION GAP SERPL CALCULATED.3IONS-SCNC: 15 MMOL/L (ref 3–16)
BACTERIA URNS QL MICRO: ABNORMAL /HPF
BILIRUB UR QL STRIP.AUTO: NEGATIVE
BUN SERPL-MCNC: 31 MG/DL (ref 7–20)
CALCIUM SERPL-MCNC: 9.8 MG/DL (ref 8.3–10.6)
CHLORIDE SERPL-SCNC: 97 MMOL/L (ref 99–110)
CLARITY UR: CLEAR
CO2 SERPL-SCNC: 25 MMOL/L (ref 21–32)
COLOR UR: YELLOW
CREAT SERPL-MCNC: 2.4 MG/DL (ref 0.8–1.3)
EKG ATRIAL RATE: 100 BPM
EKG DIAGNOSIS: NORMAL
EKG P AXIS: 51 DEGREES
EKG P-R INTERVAL: 170 MS
EKG Q-T INTERVAL: 404 MS
EKG QRS DURATION: 100 MS
EKG QTC CALCULATION (BAZETT): 521 MS
EKG R AXIS: 12 DEGREES
EKG T AXIS: 114 DEGREES
EKG VENTRICULAR RATE: 100 BPM
GFR SERPLBLD CREATININE-BSD FMLA CKD-EPI: 30 ML/MIN/{1.73_M2}
GLUCOSE SERPL-MCNC: 137 MG/DL (ref 70–99)
GLUCOSE UR STRIP.AUTO-MCNC: NEGATIVE MG/DL
HGB UR QL STRIP.AUTO: NEGATIVE
KETONES UR STRIP.AUTO-MCNC: NEGATIVE MG/DL
LEUKOCYTE ESTERASE UR QL STRIP.AUTO: NEGATIVE
NITRITE UR QL STRIP.AUTO: POSITIVE
PH UR STRIP.AUTO: 7 [PH] (ref 5–8)
PHOSPHATE SERPL-MCNC: 3.7 MG/DL (ref 2.5–4.9)
POTASSIUM SERPL-SCNC: 4 MMOL/L (ref 3.5–5.1)
PROT UR STRIP.AUTO-MCNC: ABNORMAL MG/DL
RBC #/AREA URNS HPF: ABNORMAL /HPF (ref 0–4)
SODIUM SERPL-SCNC: 137 MMOL/L (ref 136–145)
SP GR UR STRIP.AUTO: 1.01 (ref 1–1.03)
UA DIPSTICK W REFLEX MICRO PNL UR: YES
URN SPEC COLLECT METH UR: ABNORMAL
UROBILINOGEN UR STRIP-ACNC: 0.2 E.U./DL
WBC #/AREA URNS HPF: ABNORMAL /HPF (ref 0–5)

## 2024-03-13 PROCEDURE — 6370000000 HC RX 637 (ALT 250 FOR IP): Performed by: NURSE PRACTITIONER

## 2024-03-13 PROCEDURE — 6360000002 HC RX W HCPCS: Performed by: INTERNAL MEDICINE

## 2024-03-13 PROCEDURE — 81001 URINALYSIS AUTO W/SCOPE: CPT

## 2024-03-13 PROCEDURE — 92526 ORAL FUNCTION THERAPY: CPT

## 2024-03-13 PROCEDURE — 80069 RENAL FUNCTION PANEL: CPT

## 2024-03-13 PROCEDURE — 2060000000 HC ICU INTERMEDIATE R&B

## 2024-03-13 PROCEDURE — 36415 COLL VENOUS BLD VENIPUNCTURE: CPT

## 2024-03-13 PROCEDURE — 97110 THERAPEUTIC EXERCISES: CPT

## 2024-03-13 PROCEDURE — 6370000000 HC RX 637 (ALT 250 FOR IP): Performed by: INTERNAL MEDICINE

## 2024-03-13 PROCEDURE — 97530 THERAPEUTIC ACTIVITIES: CPT

## 2024-03-13 PROCEDURE — 97116 GAIT TRAINING THERAPY: CPT

## 2024-03-13 PROCEDURE — 99232 SBSQ HOSP IP/OBS MODERATE 35: CPT | Performed by: NURSE PRACTITIONER

## 2024-03-13 PROCEDURE — 2580000003 HC RX 258: Performed by: INTERNAL MEDICINE

## 2024-03-13 PROCEDURE — 97535 SELF CARE MNGMENT TRAINING: CPT

## 2024-03-13 RX ADMIN — HYDRALAZINE HYDROCHLORIDE 25 MG: 25 TABLET ORAL at 21:35

## 2024-03-13 RX ADMIN — SODIUM CHLORIDE, PRESERVATIVE FREE 10 ML: 5 INJECTION INTRAVENOUS at 09:24

## 2024-03-13 RX ADMIN — CARVEDILOL 25 MG: 25 TABLET, FILM COATED ORAL at 09:23

## 2024-03-13 RX ADMIN — NIFEDIPINE 60 MG: 60 TABLET, EXTENDED RELEASE ORAL at 09:23

## 2024-03-13 RX ADMIN — DULOXETINE HYDROCHLORIDE 30 MG: 30 CAPSULE, DELAYED RELEASE ORAL at 09:23

## 2024-03-13 RX ADMIN — LAMOTRIGINE 75 MG: 25 TABLET ORAL at 09:23

## 2024-03-13 RX ADMIN — HYDRALAZINE HYDROCHLORIDE 25 MG: 25 TABLET ORAL at 15:20

## 2024-03-13 RX ADMIN — LAMOTRIGINE 75 MG: 25 TABLET ORAL at 21:35

## 2024-03-13 RX ADMIN — HYDRALAZINE HYDROCHLORIDE 25 MG: 25 TABLET ORAL at 05:18

## 2024-03-13 RX ADMIN — ENOXAPARIN SODIUM 40 MG: 100 INJECTION SUBCUTANEOUS at 09:23

## 2024-03-13 RX ADMIN — TAMSULOSIN HYDROCHLORIDE 0.4 MG: 0.4 CAPSULE ORAL at 09:23

## 2024-03-13 RX ADMIN — CARVEDILOL 25 MG: 25 TABLET, FILM COATED ORAL at 21:34

## 2024-03-13 RX ADMIN — ACETAMINOPHEN 650 MG: 325 TABLET ORAL at 15:32

## 2024-03-13 RX ADMIN — SODIUM CHLORIDE, PRESERVATIVE FREE 10 ML: 5 INJECTION INTRAVENOUS at 21:34

## 2024-03-13 ASSESSMENT — PAIN DESCRIPTION - LOCATION: LOCATION: GENERALIZED

## 2024-03-13 ASSESSMENT — PAIN SCALES - GENERAL
PAINLEVEL_OUTOF10: 3
PAINLEVEL_OUTOF10: 0

## 2024-03-13 ASSESSMENT — PAIN - FUNCTIONAL ASSESSMENT: PAIN_FUNCTIONAL_ASSESSMENT: PREVENTS OR INTERFERES SOME ACTIVE ACTIVITIES AND ADLS

## 2024-03-13 ASSESSMENT — PAIN DESCRIPTION - DESCRIPTORS: DESCRIPTORS: ACHING

## 2024-03-13 NOTE — CONSULTS
Joe Back  3/13/2024  [id number]    Chief Complaint: Non-cardiac chest pain    Subjective:   64M, p/w with chest pain which appeared to be MSK and not concerning for ischemic event. Pt also being tx for LANCE and uncontrolled hypertension. Pt has hx intracranial hemorrhage complicated by seizures and strokes following. Recently at SNF for rehab prior to this hospital admission. Pt reports motivation for rehab and able to tolerate 3 hours of therapy. Plan for [facility]       ROS: No CP, SOB, dyspnea    Objective:  Patient Vitals for the past 24 hrs:   BP Temp Temp src Pulse Resp SpO2 Weight   03/13/24 0834 (!) 145/79 97.7 °F (36.5 °C) Oral 74 17 96 % --   03/13/24 0520 -- -- -- -- -- -- 84.4 kg (186 lb 1.1 oz)   03/13/24 0430 130/85 97.3 °F (36.3 °C) Oral 64 16 99 % --   03/12/24 2340 131/86 97.3 °F (36.3 °C) Axillary 65 18 97 % --   03/12/24 1945 133/78 98 °F (36.7 °C) Oral 70 16 97 % --   03/12/24 1739 129/82 -- -- 80 -- -- --   03/12/24 1512 130/78 97.8 °F (36.6 °C) Temporal 65 18 98 % --   03/12/24 1222 119/73 97.5 °F (36.4 °C) Temporal 69 16 99 % --     Gen: No distress, pleasant.   HEENT: Normocephalic, atraumatic.   CV: No audible murmurs, well perfused extremities  Resp: No respiratory distress. No increased WOB  Abd: Soft, nontender nondistended  Ext: No edema.   Neuro: Alert, oriented, appropriately interactive. BUE 5/5, RLE 3-4/5, LLE 5/5. Sensation intact bilaterally.    Laboratory data: Available via EMR.     Therapy progress:    PT    Rolling: Level of difficulty - A Lot   Sit to Stand from a Chair: Level of difficulty - A Lot  Supine to Sit: Level of difficulty - A Lot     Bed to Chair: Physical Assistance Required - Total  Ambulate Across Room: Physical Assistance Required - Total  Ascend 3-5 Steps With HR: Physical Assistance Required - Total    OT    Eating: Physical Assistance Required - A Little  Grooming: Physical Assistance Required - A Little  LB Dressing: Physical Assistance Required - A

## 2024-03-13 NOTE — CARE COORDINATION
CM following for discharge planning. CM spoke with wife Mere and she would like precert started at [facility]. Message left for [facility] Liaison to start precert.     Amanda Avelar RN, BSN,   Case Management Department  [phone]

## 2024-03-13 NOTE — CONSULTS
Consult Note  Physical Medicine and Rehabilitation    Patient: Joe Wood  [id number]  Date: 3/13/2024      Chief Complaint: chest pain    History of Present Illness/Hospital Course:  64M, p/w with chest pain which appeared to be MSK and not concerning for ischemic event. Pt also being tx for LANCE and uncontrolled hypertension. Pt has hx intracranial hemorrhage complicated by seizures and strokes following. Recently at SNF for rehab prior to this hospital admission. Pt reports motivation for rehab and able to tolerate 3 hours of therapy    Prior Level of Function:  Minimal assistance required, otherwise mostly Independent for mobility, ADLs, and IADLs    Current Level of Function:  Limited with all aspects of mobility       History reviewed. No pertinent past medical history.    History reviewed. No pertinent surgical history.    No family history on file.    Social History     Socioeconomic History    Marital status:      Spouse name: None    Number of children: None    Years of education: None    Highest education level: None   Tobacco Use    Smoking status: Never    Smokeless tobacco: Never     Social Determinants of Health     Food Insecurity: No Food Insecurity (3/8/2024)    Hunger Vital Sign     Worried About Running Out of Food in the Last Year: Never true     Ran Out of Food in the Last Year: Never true   Transportation Needs: No Transportation Needs (3/8/2024)    PRAPARE - Transportation     Lack of Transportation (Medical): No     Lack of Transportation (Non-Medical): No   Housing Stability: Low Risk  (3/8/2024)    Housing Stability Vital Sign     Unable to Pay for Housing in the Last Year: No     Number of Places Lived in the Last Year: 2     Unstable Housing in the Last Year: No           REVIEW OF SYSTEMS:   CONSTITUTIONAL: negative for fevers, chills, diaphoresis, appetite change, night sweats, unexpected weight change, fatigue  EYES: negative for blurred vision, eye discharge, visual

## 2024-03-14 ENCOUNTER — APPOINTMENT (OUTPATIENT)
Dept: ULTRASOUND IMAGING | Age: 64
DRG: 304 | End: 2024-03-14
Payer: COMMERCIAL

## 2024-03-14 LAB
ALBUMIN SERPL-MCNC: 4 G/DL (ref 3.4–5)
ANION GAP SERPL CALCULATED.3IONS-SCNC: 9 MMOL/L (ref 3–16)
BUN SERPL-MCNC: 32 MG/DL (ref 7–20)
CALCIUM SERPL-MCNC: 9.8 MG/DL (ref 8.3–10.6)
CHLORIDE SERPL-SCNC: 100 MMOL/L (ref 99–110)
CO2 SERPL-SCNC: 29 MMOL/L (ref 21–32)
CREAT SERPL-MCNC: 2.6 MG/DL (ref 0.8–1.3)
GFR SERPLBLD CREATININE-BSD FMLA CKD-EPI: 27 ML/MIN/{1.73_M2}
GLUCOSE SERPL-MCNC: 97 MG/DL (ref 70–99)
PHOSPHATE SERPL-MCNC: 4.2 MG/DL (ref 2.5–4.9)
POTASSIUM SERPL-SCNC: 4 MMOL/L (ref 3.5–5.1)
SODIUM SERPL-SCNC: 138 MMOL/L (ref 136–145)

## 2024-03-14 PROCEDURE — 6370000000 HC RX 637 (ALT 250 FOR IP): Performed by: INTERNAL MEDICINE

## 2024-03-14 PROCEDURE — 36415 COLL VENOUS BLD VENIPUNCTURE: CPT

## 2024-03-14 PROCEDURE — 99233 SBSQ HOSP IP/OBS HIGH 50: CPT | Performed by: NURSE PRACTITIONER

## 2024-03-14 PROCEDURE — 2060000000 HC ICU INTERMEDIATE R&B

## 2024-03-14 PROCEDURE — 92526 ORAL FUNCTION THERAPY: CPT

## 2024-03-14 PROCEDURE — 6360000002 HC RX W HCPCS: Performed by: INTERNAL MEDICINE

## 2024-03-14 PROCEDURE — 2580000003 HC RX 258: Performed by: INTERNAL MEDICINE

## 2024-03-14 PROCEDURE — 76770 US EXAM ABDO BACK WALL COMP: CPT

## 2024-03-14 PROCEDURE — 51798 US URINE CAPACITY MEASURE: CPT

## 2024-03-14 PROCEDURE — 80069 RENAL FUNCTION PANEL: CPT

## 2024-03-14 PROCEDURE — 93306 TTE W/DOPPLER COMPLETE: CPT

## 2024-03-14 PROCEDURE — 6370000000 HC RX 637 (ALT 250 FOR IP): Performed by: NURSE PRACTITIONER

## 2024-03-14 RX ORDER — FUROSEMIDE 10 MG/ML
40 INJECTION INTRAMUSCULAR; INTRAVENOUS ONCE
Status: COMPLETED | OUTPATIENT
Start: 2024-03-14 | End: 2024-03-14

## 2024-03-14 RX ADMIN — LAMOTRIGINE 75 MG: 25 TABLET ORAL at 21:39

## 2024-03-14 RX ADMIN — SODIUM CHLORIDE, PRESERVATIVE FREE 10 ML: 5 INJECTION INTRAVENOUS at 21:40

## 2024-03-14 RX ADMIN — HYDRALAZINE HYDROCHLORIDE 25 MG: 25 TABLET ORAL at 21:39

## 2024-03-14 RX ADMIN — CARVEDILOL 25 MG: 25 TABLET, FILM COATED ORAL at 09:02

## 2024-03-14 RX ADMIN — LAMOTRIGINE 75 MG: 25 TABLET ORAL at 08:52

## 2024-03-14 RX ADMIN — DULOXETINE HYDROCHLORIDE 30 MG: 30 CAPSULE, DELAYED RELEASE ORAL at 08:52

## 2024-03-14 RX ADMIN — ENOXAPARIN SODIUM 40 MG: 100 INJECTION SUBCUTANEOUS at 09:28

## 2024-03-14 RX ADMIN — TAMSULOSIN HYDROCHLORIDE 0.4 MG: 0.4 CAPSULE ORAL at 08:52

## 2024-03-14 RX ADMIN — HYDRALAZINE HYDROCHLORIDE 25 MG: 25 TABLET ORAL at 13:47

## 2024-03-14 RX ADMIN — NIFEDIPINE 60 MG: 60 TABLET, EXTENDED RELEASE ORAL at 08:52

## 2024-03-14 RX ADMIN — HYDRALAZINE HYDROCHLORIDE 25 MG: 25 TABLET ORAL at 05:25

## 2024-03-14 RX ADMIN — FUROSEMIDE 40 MG: 10 INJECTION, SOLUTION INTRAMUSCULAR; INTRAVENOUS at 08:52

## 2024-03-14 RX ADMIN — CARVEDILOL 25 MG: 25 TABLET, FILM COATED ORAL at 21:39

## 2024-03-14 RX ADMIN — SODIUM CHLORIDE, PRESERVATIVE FREE 10 ML: 5 INJECTION INTRAVENOUS at 08:52

## 2024-03-14 RX ADMIN — POLYETHYLENE GLYCOL 3350 17 G: 17 POWDER, FOR SOLUTION ORAL at 09:02

## 2024-03-14 ASSESSMENT — PAIN SCALES - GENERAL
PAINLEVEL_OUTOF10: 0
PAINLEVEL_OUTOF10: 0

## 2024-03-14 NOTE — CARE COORDINATION
Cm received call from [facility]. Precert approved.    Cm met with pt and wife. Informed precert approved and can dc to [facility] when medically stable. Pt's wife would like to transport to [facility] as insurance will not pay for transport.

## 2024-03-15 ENCOUNTER — APPOINTMENT (OUTPATIENT)
Dept: CT IMAGING | Age: 64
DRG: 304 | End: 2024-03-15
Payer: COMMERCIAL

## 2024-03-15 LAB
ALBUMIN SERPL-MCNC: 3.8 G/DL (ref 3.4–5)
ANION GAP SERPL CALCULATED.3IONS-SCNC: 12 MMOL/L (ref 3–16)
BASOPHILS # BLD: 0 K/UL (ref 0–0.2)
BASOPHILS NFR BLD: 0.7 %
BILIRUB UR QL STRIP.AUTO: NEGATIVE
BUN SERPL-MCNC: 36 MG/DL (ref 7–20)
CALCIUM SERPL-MCNC: 9.1 MG/DL (ref 8.3–10.6)
CHLORIDE SERPL-SCNC: 97 MMOL/L (ref 99–110)
CLARITY UR: CLEAR
CO2 SERPL-SCNC: 27 MMOL/L (ref 21–32)
COLOR UR: YELLOW
CREAT SERPL-MCNC: 2.5 MG/DL (ref 0.8–1.3)
DEPRECATED RDW RBC AUTO: 15.3 % (ref 12.4–15.4)
EOSINOPHIL # BLD: 0.3 K/UL (ref 0–0.6)
EOSINOPHIL NFR BLD: 4.3 %
GFR SERPLBLD CREATININE-BSD FMLA CKD-EPI: 28 ML/MIN/{1.73_M2}
GLUCOSE SERPL-MCNC: 94 MG/DL (ref 70–99)
GLUCOSE UR STRIP.AUTO-MCNC: NEGATIVE MG/DL
HCT VFR BLD AUTO: 40.3 % (ref 40.5–52.5)
HGB BLD-MCNC: 13.1 G/DL (ref 13.5–17.5)
HGB UR QL STRIP.AUTO: NEGATIVE
KETONES UR STRIP.AUTO-MCNC: NEGATIVE MG/DL
LEUKOCYTE ESTERASE UR QL STRIP.AUTO: NEGATIVE
LYMPHOCYTES # BLD: 1.1 K/UL (ref 1–5.1)
LYMPHOCYTES NFR BLD: 17.1 %
MCH RBC QN AUTO: 28.7 PG (ref 26–34)
MCHC RBC AUTO-ENTMCNC: 32.4 G/DL (ref 31–36)
MCV RBC AUTO: 88.7 FL (ref 80–100)
MONOCYTES # BLD: 0.8 K/UL (ref 0–1.3)
MONOCYTES NFR BLD: 11.6 %
NEUTROPHILS # BLD: 4.4 K/UL (ref 1.7–7.7)
NEUTROPHILS NFR BLD: 66.3 %
NITRITE UR QL STRIP.AUTO: NEGATIVE
PH UR STRIP.AUTO: 6.5 [PH] (ref 5–8)
PHOSPHATE SERPL-MCNC: 4.3 MG/DL (ref 2.5–4.9)
PLATELET # BLD AUTO: 240 K/UL (ref 135–450)
PMV BLD AUTO: 7.3 FL (ref 5–10.5)
POTASSIUM SERPL-SCNC: 4.1 MMOL/L (ref 3.5–5.1)
PROT UR STRIP.AUTO-MCNC: NEGATIVE MG/DL
RBC # BLD AUTO: 4.55 M/UL (ref 4.2–5.9)
SODIUM SERPL-SCNC: 136 MMOL/L (ref 136–145)
SP GR UR STRIP.AUTO: 1.01 (ref 1–1.03)
UA COMPLETE W REFLEX CULTURE PNL UR: NORMAL
UA DIPSTICK W REFLEX MICRO PNL UR: NORMAL
URN SPEC COLLECT METH UR: NORMAL
UROBILINOGEN UR STRIP-ACNC: 0.2 E.U./DL
WBC # BLD AUTO: 6.6 K/UL (ref 4–11)

## 2024-03-15 PROCEDURE — 85025 COMPLETE CBC W/AUTO DIFF WBC: CPT

## 2024-03-15 PROCEDURE — 6370000000 HC RX 637 (ALT 250 FOR IP): Performed by: INTERNAL MEDICINE

## 2024-03-15 PROCEDURE — 2060000000 HC ICU INTERMEDIATE R&B

## 2024-03-15 PROCEDURE — 6360000002 HC RX W HCPCS: Performed by: INTERNAL MEDICINE

## 2024-03-15 PROCEDURE — 70450 CT HEAD/BRAIN W/O DYE: CPT

## 2024-03-15 PROCEDURE — 97530 THERAPEUTIC ACTIVITIES: CPT

## 2024-03-15 PROCEDURE — 99232 SBSQ HOSP IP/OBS MODERATE 35: CPT | Performed by: NURSE PRACTITIONER

## 2024-03-15 PROCEDURE — 2580000003 HC RX 258: Performed by: INTERNAL MEDICINE

## 2024-03-15 PROCEDURE — 6370000000 HC RX 637 (ALT 250 FOR IP): Performed by: NURSE PRACTITIONER

## 2024-03-15 PROCEDURE — 87086 URINE CULTURE/COLONY COUNT: CPT

## 2024-03-15 PROCEDURE — 92526 ORAL FUNCTION THERAPY: CPT

## 2024-03-15 PROCEDURE — 36415 COLL VENOUS BLD VENIPUNCTURE: CPT

## 2024-03-15 PROCEDURE — 80069 RENAL FUNCTION PANEL: CPT

## 2024-03-15 PROCEDURE — 81003 URINALYSIS AUTO W/O SCOPE: CPT

## 2024-03-15 RX ORDER — FUROSEMIDE 10 MG/ML
40 INJECTION INTRAMUSCULAR; INTRAVENOUS ONCE
Status: COMPLETED | OUTPATIENT
Start: 2024-03-15 | End: 2024-03-15

## 2024-03-15 RX ADMIN — CEFTRIAXONE 1000 MG: 1 INJECTION, POWDER, FOR SOLUTION INTRAMUSCULAR; INTRAVENOUS at 17:58

## 2024-03-15 RX ADMIN — NIFEDIPINE 60 MG: 60 TABLET, EXTENDED RELEASE ORAL at 08:39

## 2024-03-15 RX ADMIN — HYDRALAZINE HYDROCHLORIDE 25 MG: 25 TABLET ORAL at 15:17

## 2024-03-15 RX ADMIN — HYDRALAZINE HYDROCHLORIDE 25 MG: 25 TABLET ORAL at 08:39

## 2024-03-15 RX ADMIN — LAMOTRIGINE 75 MG: 25 TABLET ORAL at 08:39

## 2024-03-15 RX ADMIN — HYDRALAZINE HYDROCHLORIDE 25 MG: 25 TABLET ORAL at 21:26

## 2024-03-15 RX ADMIN — SODIUM CHLORIDE, PRESERVATIVE FREE 10 ML: 5 INJECTION INTRAVENOUS at 08:37

## 2024-03-15 RX ADMIN — ONDANSETRON 4 MG: 2 INJECTION INTRAMUSCULAR; INTRAVENOUS at 12:42

## 2024-03-15 RX ADMIN — CARVEDILOL 25 MG: 25 TABLET, FILM COATED ORAL at 08:39

## 2024-03-15 RX ADMIN — ENOXAPARIN SODIUM 40 MG: 100 INJECTION SUBCUTANEOUS at 08:37

## 2024-03-15 RX ADMIN — DULOXETINE HYDROCHLORIDE 30 MG: 30 CAPSULE, DELAYED RELEASE ORAL at 08:39

## 2024-03-15 RX ADMIN — SODIUM CHLORIDE, PRESERVATIVE FREE 10 ML: 5 INJECTION INTRAVENOUS at 21:26

## 2024-03-15 RX ADMIN — LAMOTRIGINE 75 MG: 25 TABLET ORAL at 21:26

## 2024-03-15 RX ADMIN — CARVEDILOL 25 MG: 25 TABLET, FILM COATED ORAL at 21:26

## 2024-03-15 RX ADMIN — FUROSEMIDE 40 MG: 10 INJECTION, SOLUTION INTRAMUSCULAR; INTRAVENOUS at 08:37

## 2024-03-15 RX ADMIN — POLYETHYLENE GLYCOL 3350 17 G: 17 POWDER, FOR SOLUTION ORAL at 10:15

## 2024-03-15 RX ADMIN — TAMSULOSIN HYDROCHLORIDE 0.4 MG: 0.4 CAPSULE ORAL at 08:39

## 2024-03-16 LAB
ANION GAP SERPL CALCULATED.3IONS-SCNC: 11 MMOL/L (ref 3–16)
BACTERIA UR CULT: NORMAL
BASOPHILS # BLD: 0 K/UL (ref 0–0.2)
BASOPHILS NFR BLD: 0.4 %
BUN SERPL-MCNC: 41 MG/DL (ref 7–20)
CALCIUM SERPL-MCNC: 9.5 MG/DL (ref 8.3–10.6)
CHLORIDE SERPL-SCNC: 97 MMOL/L (ref 99–110)
CO2 SERPL-SCNC: 29 MMOL/L (ref 21–32)
CREAT SERPL-MCNC: 2.6 MG/DL (ref 0.8–1.3)
DEPRECATED RDW RBC AUTO: 15 % (ref 12.4–15.4)
EOSINOPHIL # BLD: 0.3 K/UL (ref 0–0.6)
EOSINOPHIL NFR BLD: 4.9 %
GFR SERPLBLD CREATININE-BSD FMLA CKD-EPI: 27 ML/MIN/{1.73_M2}
GLUCOSE SERPL-MCNC: 101 MG/DL (ref 70–99)
HCT VFR BLD AUTO: 39.9 % (ref 40.5–52.5)
HGB BLD-MCNC: 13.4 G/DL (ref 13.5–17.5)
LYMPHOCYTES # BLD: 1 K/UL (ref 1–5.1)
LYMPHOCYTES NFR BLD: 17.7 %
MAGNESIUM SERPL-MCNC: 1.9 MG/DL (ref 1.8–2.4)
MCH RBC QN AUTO: 30.1 PG (ref 26–34)
MCHC RBC AUTO-ENTMCNC: 33.6 G/DL (ref 31–36)
MCV RBC AUTO: 89.5 FL (ref 80–100)
MONOCYTES # BLD: 0.6 K/UL (ref 0–1.3)
MONOCYTES NFR BLD: 10.5 %
NEUTROPHILS # BLD: 3.8 K/UL (ref 1.7–7.7)
NEUTROPHILS NFR BLD: 66.5 %
PHOSPHATE SERPL-MCNC: 4.7 MG/DL (ref 2.5–4.9)
PLATELET # BLD AUTO: 223 K/UL (ref 135–450)
PMV BLD AUTO: 7.7 FL (ref 5–10.5)
POTASSIUM SERPL-SCNC: 3.9 MMOL/L (ref 3.5–5.1)
RBC # BLD AUTO: 4.46 M/UL (ref 4.2–5.9)
SODIUM SERPL-SCNC: 137 MMOL/L (ref 136–145)
WBC # BLD AUTO: 5.7 K/UL (ref 4–11)

## 2024-03-16 PROCEDURE — 6360000002 HC RX W HCPCS: Performed by: INTERNAL MEDICINE

## 2024-03-16 PROCEDURE — 51798 US URINE CAPACITY MEASURE: CPT

## 2024-03-16 PROCEDURE — 2580000003 HC RX 258: Performed by: HOSPITALIST

## 2024-03-16 PROCEDURE — 84100 ASSAY OF PHOSPHORUS: CPT

## 2024-03-16 PROCEDURE — 6370000000 HC RX 637 (ALT 250 FOR IP): Performed by: NURSE PRACTITIONER

## 2024-03-16 PROCEDURE — 2580000003 HC RX 258: Performed by: INTERNAL MEDICINE

## 2024-03-16 PROCEDURE — 2060000000 HC ICU INTERMEDIATE R&B

## 2024-03-16 PROCEDURE — 6360000002 HC RX W HCPCS: Performed by: HOSPITALIST

## 2024-03-16 PROCEDURE — 6370000000 HC RX 637 (ALT 250 FOR IP): Performed by: INTERNAL MEDICINE

## 2024-03-16 PROCEDURE — 80048 BASIC METABOLIC PNL TOTAL CA: CPT

## 2024-03-16 PROCEDURE — 85025 COMPLETE CBC W/AUTO DIFF WBC: CPT

## 2024-03-16 PROCEDURE — 83735 ASSAY OF MAGNESIUM: CPT

## 2024-03-16 PROCEDURE — 36415 COLL VENOUS BLD VENIPUNCTURE: CPT

## 2024-03-16 RX ADMIN — CARVEDILOL 25 MG: 25 TABLET, FILM COATED ORAL at 08:38

## 2024-03-16 RX ADMIN — ENOXAPARIN SODIUM 40 MG: 100 INJECTION SUBCUTANEOUS at 08:37

## 2024-03-16 RX ADMIN — LAMOTRIGINE 75 MG: 25 TABLET ORAL at 08:38

## 2024-03-16 RX ADMIN — SODIUM CHLORIDE, PRESERVATIVE FREE 10 ML: 5 INJECTION INTRAVENOUS at 08:38

## 2024-03-16 RX ADMIN — SODIUM CHLORIDE 25 ML: 9 INJECTION, SOLUTION INTRAVENOUS at 17:25

## 2024-03-16 RX ADMIN — DULOXETINE HYDROCHLORIDE 30 MG: 30 CAPSULE, DELAYED RELEASE ORAL at 08:38

## 2024-03-16 RX ADMIN — POLYETHYLENE GLYCOL 3350 17 G: 17 POWDER, FOR SOLUTION ORAL at 08:55

## 2024-03-16 RX ADMIN — CEFTRIAXONE 1000 MG: 1 INJECTION, POWDER, FOR SOLUTION INTRAMUSCULAR; INTRAVENOUS at 17:26

## 2024-03-16 RX ADMIN — CARVEDILOL 25 MG: 25 TABLET, FILM COATED ORAL at 21:11

## 2024-03-16 RX ADMIN — HYDRALAZINE HYDROCHLORIDE 25 MG: 25 TABLET ORAL at 21:11

## 2024-03-16 RX ADMIN — NIFEDIPINE 60 MG: 60 TABLET, EXTENDED RELEASE ORAL at 08:38

## 2024-03-16 RX ADMIN — LAMOTRIGINE 75 MG: 25 TABLET ORAL at 21:11

## 2024-03-16 RX ADMIN — HYDRALAZINE HYDROCHLORIDE 25 MG: 25 TABLET ORAL at 14:51

## 2024-03-16 RX ADMIN — HYDRALAZINE HYDROCHLORIDE 25 MG: 25 TABLET ORAL at 06:27

## 2024-03-16 RX ADMIN — TAMSULOSIN HYDROCHLORIDE 0.4 MG: 0.4 CAPSULE ORAL at 08:38

## 2024-03-16 RX ADMIN — SODIUM CHLORIDE, PRESERVATIVE FREE 10 ML: 5 INJECTION INTRAVENOUS at 21:12

## 2024-03-16 ASSESSMENT — PAIN SCALES - GENERAL
PAINLEVEL_OUTOF10: 0

## 2024-03-17 LAB
ANION GAP SERPL CALCULATED.3IONS-SCNC: 11 MMOL/L (ref 3–16)
BASOPHILS # BLD: 0 K/UL (ref 0–0.2)
BASOPHILS NFR BLD: 0.4 %
BUN SERPL-MCNC: 41 MG/DL (ref 7–20)
CALCIUM SERPL-MCNC: 9.5 MG/DL (ref 8.3–10.6)
CHLORIDE SERPL-SCNC: 97 MMOL/L (ref 99–110)
CO2 SERPL-SCNC: 29 MMOL/L (ref 21–32)
CREAT SERPL-MCNC: 2.3 MG/DL (ref 0.8–1.3)
DEPRECATED RDW RBC AUTO: 14.9 % (ref 12.4–15.4)
EOSINOPHIL # BLD: 0.3 K/UL (ref 0–0.6)
EOSINOPHIL NFR BLD: 4.5 %
GFR SERPLBLD CREATININE-BSD FMLA CKD-EPI: 31 ML/MIN/{1.73_M2}
GLUCOSE SERPL-MCNC: 96 MG/DL (ref 70–99)
HCT VFR BLD AUTO: 40.2 % (ref 40.5–52.5)
HGB BLD-MCNC: 13.3 G/DL (ref 13.5–17.5)
LYMPHOCYTES # BLD: 1.3 K/UL (ref 1–5.1)
LYMPHOCYTES NFR BLD: 23.6 %
MAGNESIUM SERPL-MCNC: 2 MG/DL (ref 1.8–2.4)
MCH RBC QN AUTO: 29.4 PG (ref 26–34)
MCHC RBC AUTO-ENTMCNC: 33.2 G/DL (ref 31–36)
MCV RBC AUTO: 88.8 FL (ref 80–100)
MONOCYTES # BLD: 0.6 K/UL (ref 0–1.3)
MONOCYTES NFR BLD: 10.2 %
NEUTROPHILS # BLD: 3.4 K/UL (ref 1.7–7.7)
NEUTROPHILS NFR BLD: 61.3 %
PLATELET # BLD AUTO: 230 K/UL (ref 135–450)
PMV BLD AUTO: 7.6 FL (ref 5–10.5)
POTASSIUM SERPL-SCNC: 3.8 MMOL/L (ref 3.5–5.1)
RBC # BLD AUTO: 4.52 M/UL (ref 4.2–5.9)
SODIUM SERPL-SCNC: 137 MMOL/L (ref 136–145)
WBC # BLD AUTO: 5.6 K/UL (ref 4–11)

## 2024-03-17 PROCEDURE — 6370000000 HC RX 637 (ALT 250 FOR IP): Performed by: INTERNAL MEDICINE

## 2024-03-17 PROCEDURE — 6360000002 HC RX W HCPCS: Performed by: INTERNAL MEDICINE

## 2024-03-17 PROCEDURE — 80048 BASIC METABOLIC PNL TOTAL CA: CPT

## 2024-03-17 PROCEDURE — 2580000003 HC RX 258: Performed by: INTERNAL MEDICINE

## 2024-03-17 PROCEDURE — 85025 COMPLETE CBC W/AUTO DIFF WBC: CPT

## 2024-03-17 PROCEDURE — 2580000003 HC RX 258: Performed by: HOSPITALIST

## 2024-03-17 PROCEDURE — 6370000000 HC RX 637 (ALT 250 FOR IP): Performed by: NURSE PRACTITIONER

## 2024-03-17 PROCEDURE — 2060000000 HC ICU INTERMEDIATE R&B

## 2024-03-17 PROCEDURE — 83735 ASSAY OF MAGNESIUM: CPT

## 2024-03-17 PROCEDURE — 6360000002 HC RX W HCPCS: Performed by: HOSPITALIST

## 2024-03-17 PROCEDURE — 36415 COLL VENOUS BLD VENIPUNCTURE: CPT

## 2024-03-17 RX ORDER — HYDRALAZINE HYDROCHLORIDE 50 MG/1
50 TABLET, FILM COATED ORAL EVERY 8 HOURS SCHEDULED
Status: DISCONTINUED | OUTPATIENT
Start: 2024-03-17 | End: 2024-03-19 | Stop reason: HOSPADM

## 2024-03-17 RX ADMIN — LAMOTRIGINE 75 MG: 25 TABLET ORAL at 20:04

## 2024-03-17 RX ADMIN — NIFEDIPINE 60 MG: 60 TABLET, EXTENDED RELEASE ORAL at 08:55

## 2024-03-17 RX ADMIN — SODIUM CHLORIDE, PRESERVATIVE FREE 10 ML: 5 INJECTION INTRAVENOUS at 08:57

## 2024-03-17 RX ADMIN — TAMSULOSIN HYDROCHLORIDE 0.4 MG: 0.4 CAPSULE ORAL at 08:54

## 2024-03-17 RX ADMIN — DULOXETINE HYDROCHLORIDE 30 MG: 30 CAPSULE, DELAYED RELEASE ORAL at 08:55

## 2024-03-17 RX ADMIN — CARVEDILOL 25 MG: 25 TABLET, FILM COATED ORAL at 08:55

## 2024-03-17 RX ADMIN — HYDRALAZINE HYDROCHLORIDE 25 MG: 25 TABLET ORAL at 05:26

## 2024-03-17 RX ADMIN — ENOXAPARIN SODIUM 40 MG: 100 INJECTION SUBCUTANEOUS at 08:56

## 2024-03-17 RX ADMIN — HYDRALAZINE HYDROCHLORIDE 50 MG: 50 TABLET, FILM COATED ORAL at 13:02

## 2024-03-17 RX ADMIN — HYDRALAZINE HYDROCHLORIDE 50 MG: 50 TABLET, FILM COATED ORAL at 20:06

## 2024-03-17 RX ADMIN — LAMOTRIGINE 75 MG: 25 TABLET ORAL at 08:54

## 2024-03-17 RX ADMIN — CEFTRIAXONE 1000 MG: 1 INJECTION, POWDER, FOR SOLUTION INTRAMUSCULAR; INTRAVENOUS at 17:42

## 2024-03-17 RX ADMIN — POLYETHYLENE GLYCOL 3350 17 G: 17 POWDER, FOR SOLUTION ORAL at 09:05

## 2024-03-17 RX ADMIN — SODIUM CHLORIDE, PRESERVATIVE FREE 10 ML: 5 INJECTION INTRAVENOUS at 20:04

## 2024-03-17 RX ADMIN — CARVEDILOL 25 MG: 25 TABLET, FILM COATED ORAL at 20:04

## 2024-03-17 ASSESSMENT — PAIN SCALES - GENERAL
PAINLEVEL_OUTOF10: 0
PAINLEVEL_OUTOF10: 1
PAINLEVEL_OUTOF10: 0
PAINLEVEL_OUTOF10: 0

## 2024-03-18 ENCOUNTER — APPOINTMENT (OUTPATIENT)
Dept: CT IMAGING | Age: 64
DRG: 304 | End: 2024-03-18
Payer: COMMERCIAL

## 2024-03-18 PROBLEM — G40.119: Status: ACTIVE | Noted: 2024-03-18

## 2024-03-18 PROBLEM — Z86.79 HISTORY OF CEREBELLAR HEMORRHAGE: Status: ACTIVE | Noted: 2024-03-18

## 2024-03-18 LAB
ANION GAP SERPL CALCULATED.3IONS-SCNC: 12 MMOL/L (ref 3–16)
BASOPHILS # BLD: 0 K/UL (ref 0–0.2)
BASOPHILS NFR BLD: 0.2 %
BUN SERPL-MCNC: 36 MG/DL (ref 7–20)
CALCIUM SERPL-MCNC: 9.5 MG/DL (ref 8.3–10.6)
CHLORIDE SERPL-SCNC: 100 MMOL/L (ref 99–110)
CO2 SERPL-SCNC: 28 MMOL/L (ref 21–32)
CREAT SERPL-MCNC: 2.1 MG/DL (ref 0.8–1.3)
DEPRECATED RDW RBC AUTO: 14.7 % (ref 12.4–15.4)
EOSINOPHIL # BLD: 0.2 K/UL (ref 0–0.6)
EOSINOPHIL NFR BLD: 3.6 %
GFR SERPLBLD CREATININE-BSD FMLA CKD-EPI: 35 ML/MIN/{1.73_M2}
GLUCOSE SERPL-MCNC: 100 MG/DL (ref 70–99)
HCT VFR BLD AUTO: 39.5 % (ref 40.5–52.5)
HGB BLD-MCNC: 13.2 G/DL (ref 13.5–17.5)
LYMPHOCYTES # BLD: 0.9 K/UL (ref 1–5.1)
LYMPHOCYTES NFR BLD: 16.3 %
MAGNESIUM SERPL-MCNC: 2.1 MG/DL (ref 1.8–2.4)
MCH RBC QN AUTO: 29.4 PG (ref 26–34)
MCHC RBC AUTO-ENTMCNC: 33.4 G/DL (ref 31–36)
MCV RBC AUTO: 87.9 FL (ref 80–100)
MONOCYTES # BLD: 0.5 K/UL (ref 0–1.3)
MONOCYTES NFR BLD: 8.3 %
NEUTROPHILS # BLD: 4 K/UL (ref 1.7–7.7)
NEUTROPHILS NFR BLD: 71.6 %
PLATELET # BLD AUTO: 239 K/UL (ref 135–450)
PMV BLD AUTO: 7.8 FL (ref 5–10.5)
POTASSIUM SERPL-SCNC: 4 MMOL/L (ref 3.5–5.1)
RBC # BLD AUTO: 4.49 M/UL (ref 4.2–5.9)
SODIUM SERPL-SCNC: 140 MMOL/L (ref 136–145)
WBC # BLD AUTO: 5.6 K/UL (ref 4–11)

## 2024-03-18 PROCEDURE — 2060000000 HC ICU INTERMEDIATE R&B

## 2024-03-18 PROCEDURE — 83735 ASSAY OF MAGNESIUM: CPT

## 2024-03-18 PROCEDURE — 92526 ORAL FUNCTION THERAPY: CPT

## 2024-03-18 PROCEDURE — 97112 NEUROMUSCULAR REEDUCATION: CPT

## 2024-03-18 PROCEDURE — 6370000000 HC RX 637 (ALT 250 FOR IP): Performed by: INTERNAL MEDICINE

## 2024-03-18 PROCEDURE — 99223 1ST HOSP IP/OBS HIGH 75: CPT | Performed by: PSYCHIATRY & NEUROLOGY

## 2024-03-18 PROCEDURE — 97535 SELF CARE MNGMENT TRAINING: CPT

## 2024-03-18 PROCEDURE — 97110 THERAPEUTIC EXERCISES: CPT

## 2024-03-18 PROCEDURE — 2580000003 HC RX 258: Performed by: INTERNAL MEDICINE

## 2024-03-18 PROCEDURE — 80048 BASIC METABOLIC PNL TOTAL CA: CPT

## 2024-03-18 PROCEDURE — 97530 THERAPEUTIC ACTIVITIES: CPT

## 2024-03-18 PROCEDURE — 51798 US URINE CAPACITY MEASURE: CPT

## 2024-03-18 PROCEDURE — 70450 CT HEAD/BRAIN W/O DYE: CPT

## 2024-03-18 PROCEDURE — 36415 COLL VENOUS BLD VENIPUNCTURE: CPT

## 2024-03-18 PROCEDURE — 6360000002 HC RX W HCPCS: Performed by: INTERNAL MEDICINE

## 2024-03-18 PROCEDURE — 97116 GAIT TRAINING THERAPY: CPT

## 2024-03-18 PROCEDURE — 85025 COMPLETE CBC W/AUTO DIFF WBC: CPT

## 2024-03-18 RX ORDER — LAMOTRIGINE 100 MG/1
100 TABLET ORAL 2 TIMES DAILY
Status: DISCONTINUED | OUTPATIENT
Start: 2024-03-18 | End: 2024-03-19 | Stop reason: HOSPADM

## 2024-03-18 RX ADMIN — SODIUM CHLORIDE, PRESERVATIVE FREE 10 ML: 5 INJECTION INTRAVENOUS at 19:57

## 2024-03-18 RX ADMIN — SODIUM CHLORIDE, PRESERVATIVE FREE 10 ML: 5 INJECTION INTRAVENOUS at 08:18

## 2024-03-18 RX ADMIN — ENOXAPARIN SODIUM 40 MG: 100 INJECTION SUBCUTANEOUS at 08:17

## 2024-03-18 RX ADMIN — HYDRALAZINE HYDROCHLORIDE 50 MG: 50 TABLET, FILM COATED ORAL at 04:30

## 2024-03-18 RX ADMIN — POLYETHYLENE GLYCOL 3350 17 G: 17 POWDER, FOR SOLUTION ORAL at 08:36

## 2024-03-18 RX ADMIN — NIFEDIPINE 60 MG: 60 TABLET, EXTENDED RELEASE ORAL at 08:17

## 2024-03-18 RX ADMIN — TAMSULOSIN HYDROCHLORIDE 0.4 MG: 0.4 CAPSULE ORAL at 08:17

## 2024-03-18 RX ADMIN — LAMOTRIGINE 75 MG: 25 TABLET ORAL at 08:17

## 2024-03-18 RX ADMIN — CARVEDILOL 25 MG: 25 TABLET, FILM COATED ORAL at 19:57

## 2024-03-18 RX ADMIN — HYDRALAZINE HYDROCHLORIDE 50 MG: 50 TABLET, FILM COATED ORAL at 14:57

## 2024-03-18 RX ADMIN — HYDRALAZINE HYDROCHLORIDE 50 MG: 50 TABLET, FILM COATED ORAL at 21:47

## 2024-03-18 RX ADMIN — LAMOTRIGINE 100 MG: 100 TABLET ORAL at 20:01

## 2024-03-18 RX ADMIN — DULOXETINE HYDROCHLORIDE 30 MG: 30 CAPSULE, DELAYED RELEASE ORAL at 08:17

## 2024-03-18 RX ADMIN — CARVEDILOL 25 MG: 25 TABLET, FILM COATED ORAL at 08:17

## 2024-03-18 ASSESSMENT — PAIN SCALES - GENERAL
PAINLEVEL_OUTOF10: 0
PAINLEVEL_OUTOF10: 0

## 2024-03-18 NOTE — CARE COORDINATION
1:48 PM  Awaiting Neuro cons.     Electronically signed by Claus Bills RN, TOMMY on 3/18/2024 at 1:48 PM.  Phone: [phone]  Fax: [phone]      10:07 AM  Precert is good through tomorrow. Patient must admit before midnight tomorrow before new cert must be started.     Electronically signed by Claus Bills RN, TOMMY on 3/18/2024 at 10:08 AM.  Phone: [phone]  Fax: [phone]      8:36 AM  Plan to dc to [facility] in [place] for ARU.   Spoke with Akil in admissions; clarifying cert expiration on his morning meeting today, will call CM back.   Wife at bedside.   CM following.     Electronically signed by Claus Bills RN, TOMMY on 3/18/2024 at 8:39 AM.  Phone: [phone]  Fax: [phone]

## 2024-03-18 NOTE — PLAN OF CARE
Problem: Discharge Planning  Goal: Discharge to home or other facility with appropriate resources  3/11/2024 0522 by Yakelin Esqueda RN  Outcome: Progressing  3/10/2024 2018 by Nia Patel RN  Outcome: Progressing  Flowsheets (Taken 3/10/2024 1555)  Discharge to home or other facility with appropriate resources:   Refer to discharge planning if patient needs post-hospital services based on physician order or complex needs related to functional status, cognitive ability or social support system   Identify barriers to discharge with patient and caregiver     Problem: Pain  Goal: Verbalizes/displays adequate comfort level or baseline comfort level  3/11/2024 0522 by Yakelin Esqueda, RN  Outcome: Progressing  3/10/2024 2018 by Nia Patel RN  Outcome: Progressing     Problem: Skin/Tissue Integrity  Goal: Absence of new skin breakdown  Description: 1.  Monitor for areas of redness and/or skin breakdown  2.  Assess vascular access sites hourly  3.  Every 4-6 hours minimum:  Change oxygen saturation probe site  4.  Every 4-6 hours:  If on nasal continuous positive airway pressure, respiratory therapy assess nares and determine need for appliance change or resting period.  3/11/2024 0522 by Yakelin Esqueda, RN  Outcome: Progressing  3/10/2024 2018 by Nia Patel, RN  Outcome: Progressing     Problem: Safety - Adult  Goal: Free from fall injury  3/11/2024 0522 by Yakelin Esqueda, RN  Outcome: Progressing  3/10/2024 2018 by Nia Patel, RN  Outcome: Progressing     
  Problem: Discharge Planning  Goal: Discharge to home or other facility with appropriate resources  3/12/2024 1519 by Jorge Norman, RN  Outcome: Progressing  Flowsheets (Taken 3/12/2024 1519)  Discharge to home or other facility with appropriate resources: Identify barriers to discharge with patient and caregiver  Note: Pt and wife had visitor from an ARU.     Problem: Pain  Goal: Verbalizes/displays adequate comfort level or baseline comfort level  3/12/2024 1519 by Jorge Norman, RN  Outcome: Progressing  Flowsheets (Taken 3/12/2024 1519)  Verbalizes/displays adequate comfort level or baseline comfort level: Encourage patient to monitor pain and request assistance  Note: Denies pain     
  Problem: Discharge Planning  Goal: Discharge to home or other facility with appropriate resources  3/12/2024 2102 by Halima Davidson, RN  Outcome: Progressing  Plan of care reviewed with pt. All questions answered at this time.      Problem: Pain  Goal: Verbalizes/displays adequate comfort level or baseline comfort level  3/12/2024 2102 by Halima Davidson, RN  Outcome: Progressing   Pt denies pain. Will continue to assess pain on 0-10 scale for appropriate pain management.  
  Problem: Discharge Planning  Goal: Discharge to home or other facility with appropriate resources  3/9/2024 0930 by Miri Armando RN  Outcome: Progressing  3/9/2024 0046 by Axel Bueno RN  Outcome: Progressing  Flowsheets (Taken 3/9/2024 0046)  Discharge to home or other facility with appropriate resources:   Identify barriers to discharge with patient and caregiver   Arrange for needed discharge resources and transportation as appropriate   Identify discharge learning needs (meds, wound care, etc)   Refer to discharge planning if patient needs post-hospital services based on physician order or complex needs related to functional status, cognitive ability or social support system     Problem: Pain  Goal: Verbalizes/displays adequate comfort level or baseline comfort level  3/9/2024 0930 by Miri Armando RN  Outcome: Progressing  3/9/2024 0046 by Axel Bueno RN  Outcome: Progressing  Flowsheets (Taken 3/9/2024 0046)  Verbalizes/displays adequate comfort level or baseline comfort level:   Encourage patient to monitor pain and request assistance   Assess pain using appropriate pain scale   Administer analgesics based on type and severity of pain and evaluate response   Implement non-pharmacological measures as appropriate and evaluate response     Problem: Skin/Tissue Integrity  Goal: Absence of new skin breakdown  Description: 1.  Monitor for areas of redness and/or skin breakdown  2.  Assess vascular access sites hourly  3.  Every 4-6 hours minimum:  Change oxygen saturation probe site  4.  Every 4-6 hours:  If on nasal continuous positive airway pressure, respiratory therapy assess nares and determine need for appliance change or resting period.  3/9/2024 0930 by Miri Amrando RN  Outcome: Progressing  3/9/2024 0046 by Axel Bueno RN  Outcome: Progressing     Problem: Safety - Adult  Goal: Free from fall injury  3/9/2024 0930 by Miri Armando RN  Outcome: 
  Problem: Discharge Planning  Goal: Discharge to home or other facility with appropriate resources  Outcome: Progressing     Pt cont to be turned by staff Q2H in bed to prevent skin breakdown. No new skin issues noted, gray care given routinely and as needed, barrier wipes used. Mepilex in place to coccyx and changed last night.    Problem: Skin/Tissue Integrity  Goal: Absence of new skin breakdown  Description: 1.  Monitor for areas of redness and/or skin breakdown  2.  Assess vascular access sites hourly  3.  Every 4-6 hours minimum:  Change oxygen saturation probe site  4.  Every 4-6 hours:  If on nasal continuous positive airway pressure, respiratory therapy assess nares and determine need for appliance change or resting period.  Outcome: Progressing     Problem: Safety - Adult  Goal: Free from fall injury  Outcome: Progressing     
  Problem: Discharge Planning  Goal: Discharge to home or other facility with appropriate resources  Outcome: Progressing  Flowsheets (Taken 3/10/2024 1555)  Discharge to home or other facility with appropriate resources:   Refer to discharge planning if patient needs post-hospital services based on physician order or complex needs related to functional status, cognitive ability or social support system   Identify barriers to discharge with patient and caregiver     Problem: Pain  Goal: Verbalizes/displays adequate comfort level or baseline comfort level  Outcome: Progressing     Problem: Skin/Tissue Integrity  Goal: Absence of new skin breakdown  Description: 1.  Monitor for areas of redness and/or skin breakdown  2.  Assess vascular access sites hourly  3.  Every 4-6 hours minimum:  Change oxygen saturation probe site  4.  Every 4-6 hours:  If on nasal continuous positive airway pressure, respiratory therapy assess nares and determine need for appliance change or resting period.  Outcome: Progressing     Problem: Safety - Adult  Goal: Free from fall injury  Outcome: Progressing     Problem: SLP Adult - Impaired Swallowing  Goal: By Discharge: Advance to least restrictive diet without signs or symptoms of aspiration for planned discharge setting.  See evaluation for individualized goals.  3/10/2024 1025 by Heavenly Reinoso, SLP  Outcome: Progressing     
  Problem: Discharge Planning  Goal: Discharge to home or other facility with appropriate resources  Outcome: Progressing  Flowsheets (Taken 3/18/2024 1238)  Discharge to home or other facility with appropriate resources:   Identify barriers to discharge with patient and caregiver   Identify discharge learning needs (meds, wound care, etc)  Note: Pt plans to dc back to snf      Problem: Pain  Goal: Verbalizes/displays adequate comfort level or baseline comfort level  Outcome: Progressing  Flowsheets (Taken 3/18/2024 1238)  Verbalizes/displays adequate comfort level or baseline comfort level:   Administer analgesics based on type and severity of pain and evaluate response   Encourage patient to monitor pain and request assistance  Note: Encouraged pt to notify rn of any pain     Problem: Skin/Tissue Integrity  Goal: Absence of new skin breakdown  Description: 1.  Monitor for areas of redness and/or skin breakdown  2.  Assess vascular access sites hourly  3.  Every 4-6 hours minimum:  Change oxygen saturation probe site  4.  Every 4-6 hours:  If on nasal continuous positive airway pressure, respiratory therapy assess nares and determine need for appliance change or resting period.  Outcome: Progressing  Note: Turn pt q2 hours      Problem: Safety - Adult  Goal: Free from fall injury  Outcome: Progressing  Flowsheets (Taken 3/18/2024 1238)  Free From Fall Injury: Instruct family/caregiver on patient safety  Note: All fall precautions in place, call light and bedside table within reach      
  Problem: Discharge Planning  Goal: Discharge to home or other facility with appropriate resources  Outcome: Progressing  Flowsheets (Taken 3/9/2024 0046)  Discharge to home or other facility with appropriate resources:   Identify barriers to discharge with patient and caregiver   Arrange for needed discharge resources and transportation as appropriate   Identify discharge learning needs (meds, wound care, etc)   Refer to discharge planning if patient needs post-hospital services based on physician order or complex needs related to functional status, cognitive ability or social support system     Problem: Pain  Goal: Verbalizes/displays adequate comfort level or baseline comfort level  Outcome: Progressing  Flowsheets (Taken 3/9/2024 0046)  Verbalizes/displays adequate comfort level or baseline comfort level:   Encourage patient to monitor pain and request assistance   Assess pain using appropriate pain scale   Administer analgesics based on type and severity of pain and evaluate response   Implement non-pharmacological measures as appropriate and evaluate response     Problem: Skin/Tissue Integrity  Goal: Absence of new skin breakdown  Description: 1.  Monitor for areas of redness and/or skin breakdown  2.  Assess vascular access sites hourly  3.  Every 4-6 hours minimum:  Change oxygen saturation probe site  4.  Every 4-6 hours:  If on nasal continuous positive airway pressure, respiratory therapy assess nares and determine need for appliance change or resting period.  Outcome: Progressing     Problem: Safety - Adult  Goal: Free from fall injury  Outcome: Progressing  Flowsheets (Taken 3/9/2024 0046)  Free From Fall Injury: Instruct family/caregiver on patient safety  Note: Fall protocol in place      
  Problem: Pain  Goal: Verbalizes/displays adequate comfort level or baseline comfort level  Flowsheets (Taken 3/11/2024 2030)  Verbalizes/displays adequate comfort level or baseline comfort level: Encourage patient to monitor pain and request assistance  Note: Denies pain at this time, vitals stable, assisted to position of comfort.       Problem: Skin/Tissue Integrity  Goal: Absence of new skin breakdown  Description: 1.  Monitor for areas of redness and/or skin breakdown  2.  Assess vascular access sites hourly  3.  Every 4-6 hours minimum:  Change oxygen saturation probe site  4.  Every 4-6 hours:  If on nasal continuous positive airway pressure, respiratory therapy assess nares and determine need for appliance change or resting period.  Note: Pt at risk for skin breakdown. See Lopez score. Pt remains on bedrest. Unable to reposition self in bed. Heels elevated off bed. Sacral heart mepilex intact to protect,  site inspected and intact underneath.  Will continue to turn and reposition patient every two hours and as needed. Will continue to keep patient clean and dry, applying skin care cream as needed. Pillows used for repositioning q2hs.         Problem: Safety - Adult  Goal: Free from fall injury  Note: Pt free from injury or falls at this time, fall precautions in place, bed in low position, side rail up x2, Miranda Fall Risk: High (45 and higher), bed alarm on, reoriented to room and call light, reminded not to get up without assistance, call light in reach, will continue to monitor. Pt verbalizes understanding of fall risk procedures.       
  Problem: Pain  Goal: Verbalizes/displays adequate comfort level or baseline comfort level  Outcome: Progressing  Pt currently denies pain, instructed to call out for needs/ assistance     Problem: Safety - Adult  Goal: Free from fall injury  3/14/2024 0751 by Jennifer Corona, RN  Outcome: Progressing  All fall precautions in place. Bed locked and in lowest position with alarm on. Overbed table and personal belonings within reach. Call light within reach and patient instructed to use call light for assistance. Non-skid socks on.      
  Problem: SLP Adult - Impaired Swallowing  Goal: By Discharge: Advance to least restrictive diet without signs or symptoms of aspiration for planned discharge setting.  See evaluation for individualized goals.  Outcome: Progressing     Heavenly Reinoso M.A. CCC-SLP   Speech-Language Pathologist   [facility] PRN  [id number]    Pg. # [phone]    
Problem: Discharge Planning  Goal: Discharge to home or other facility with appropriate resources  Outcome: Progressing     Pt cont to be turned by staff Q2H in bed to prevent skin breakdown. No new skin issues noted, gray care given routinely and as needed, barrier wipes used. Mepilex in place to coccyx and changed last night.     Problem: Skin/Tissue Integrity  Goal: Absence of new skin breakdown  Description: 1.  Monitor for areas of redness and/or skin breakdown  2.  Assess vascular access sites hourly  3.  Every 4-6 hours minimum:  Change oxygen saturation probe site  4.  Every 4-6 hours:  If on nasal continuous positive airway pressure, respiratory therapy assess nares and determine need for appliance change or resting period.  Outcome: Progressing     Problem: Safety - Adult  Goal: Free from fall injury  Outcome: Progressing  
Pt cont to be turned by staff Q2H in bed to prevent skin breakdown. No new skin issues noted, gray care given routinely and as needed, barrier wipes used.    Problem: Skin/Tissue Integrity  Goal: Absence of new skin breakdown  Description: 1.  Monitor for areas of redness and/or skin breakdown  2.  Assess vascular access sites hourly  3.  Every 4-6 hours minimum:  Change oxygen saturation probe site  4.  Every 4-6 hours:  If on nasal continuous positive airway pressure, respiratory therapy assess nares and determine need for appliance change or resting period.  Outcome: Progressing    No falls noted thus far this shift, bed in lowest position, alarm on, non-skid socks on, call light within reach, hourly checks, safety maintained, will continue to monitor.     Problem: Safety - Adult  Goal: Free from fall injury  Outcome: Progressing     
Pt continues to progress medically. Voices no c/o pain and able to take all HS meds one at a time with apple sauce with no issues noted. He helps with turning and repositioning.   
Pt progressing with urinary output. Urine clear and yellow without odor. Purewick changed and canister. He voices no c/o pain. Pt plans to discharge back to [facility].  
understanding of fall risk procedures.       
with transferring infant if caregiver noted to have fall risk factors     Problem: ABCDS Injury Assessment  Goal: Absence of physical injury  Outcome: Progressing  Flowsheets (Taken 3/13/2024 3860)  Absence of Physical Injury: Implement safety measures based on patient assessment

## 2024-03-18 NOTE — CONSULTS
Neurology Consultation Note      Patient: Joe Wood MRN: [id number]    YOB: 1960  Age: 64 y.o.  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; planning for discharge\"    ASSESSMENT & RECOMMENDATIONS     Assessment & Recommendations:  63yo man with prior right cerebellar hemorrhage with presumed cerebral amyloid angiopathy (CAA) and seizure disorder presented to ER with chest pain 10 days ago and now with worsened use of his right hand/arm as well as ongoing partial seizures involving his right lower face in the context of having been weaned off oxcarbazepine and titrating up his lamotrigine  Have no reason to believe that the antiepileptic drug (AED) plan as devised by his neurologist at  is not appropriate  His right facial seizures are known to be very refractory, according to his wife, and so hopeful that increase in lamotrigine as of tonight may be of benefit  Pt needs to follow-up with his outpt neurologist to manage this further  However, pt's wife is very concerned about the decline in function of his right arm  Getting an MRI has been very difficult for him and requires anesthesia; risk outweighs benefit of this in light of his right arm being so painful which makes stroke/hemorrhage or any primary neurologic etiology very low  Because symptoms are new since prior head CT, will repeat this now but yield is low  Would have suspected that some other acute issue may be playing a role here leading to a recrudescence of stroke symptoms, but there are no obvious metabolic derangements or infectious causes (ie, no UTI, etc)    SUBJECTIVE     Chief

## 2024-03-19 ENCOUNTER — APPOINTMENT (OUTPATIENT)
Dept: VASCULAR LAB | Age: 64
DRG: 304 | End: 2024-03-19
Payer: COMMERCIAL

## 2024-03-19 VITALS
TEMPERATURE: 97.6 F | WEIGHT: 185.19 LBS | OXYGEN SATURATION: 98 % | DIASTOLIC BLOOD PRESSURE: 71 MMHG | BODY MASS INDEX: 23.03 KG/M2 | HEART RATE: 80 BPM | HEIGHT: 75 IN | SYSTOLIC BLOOD PRESSURE: 119 MMHG | RESPIRATION RATE: 16 BRPM

## 2024-03-19 LAB
ANION GAP SERPL CALCULATED.3IONS-SCNC: 9 MMOL/L (ref 3–16)
BUN SERPL-MCNC: 34 MG/DL (ref 7–20)
CALCIUM SERPL-MCNC: 9.4 MG/DL (ref 8.3–10.6)
CHLORIDE SERPL-SCNC: 101 MMOL/L (ref 99–110)
CO2 SERPL-SCNC: 29 MMOL/L (ref 21–32)
CREAT SERPL-MCNC: 2.2 MG/DL (ref 0.8–1.3)
GFR SERPLBLD CREATININE-BSD FMLA CKD-EPI: 33 ML/MIN/{1.73_M2}
GLUCOSE SERPL-MCNC: 103 MG/DL (ref 70–99)
POTASSIUM SERPL-SCNC: 4.1 MMOL/L (ref 3.5–5.1)
SODIUM SERPL-SCNC: 139 MMOL/L (ref 136–145)

## 2024-03-19 PROCEDURE — 6360000002 HC RX W HCPCS: Performed by: INTERNAL MEDICINE

## 2024-03-19 PROCEDURE — 92526 ORAL FUNCTION THERAPY: CPT

## 2024-03-19 PROCEDURE — 6370000000 HC RX 637 (ALT 250 FOR IP): Performed by: INTERNAL MEDICINE

## 2024-03-19 PROCEDURE — 51798 US URINE CAPACITY MEASURE: CPT

## 2024-03-19 PROCEDURE — 2580000003 HC RX 258: Performed by: INTERNAL MEDICINE

## 2024-03-19 PROCEDURE — 36415 COLL VENOUS BLD VENIPUNCTURE: CPT

## 2024-03-19 PROCEDURE — 93971 EXTREMITY STUDY: CPT

## 2024-03-19 PROCEDURE — 80048 BASIC METABOLIC PNL TOTAL CA: CPT

## 2024-03-19 RX ORDER — HYDRALAZINE HYDROCHLORIDE 50 MG/1
50 TABLET, FILM COATED ORAL EVERY 8 HOURS SCHEDULED
Qty: 90 TABLET | Refills: 3
Start: 2024-03-19

## 2024-03-19 RX ORDER — HYDROCHLOROTHIAZIDE 12.5 MG/1
12.5 TABLET ORAL DAILY
Qty: 30 TABLET | Refills: 3
Start: 2024-03-20

## 2024-03-19 RX ORDER — CLONIDINE 0.2 MG/24H
1 PATCH, EXTENDED RELEASE TRANSDERMAL WEEKLY
Qty: 4 PATCH | Refills: 0
Start: 2024-03-23

## 2024-03-19 RX ORDER — HYDROCHLOROTHIAZIDE 25 MG/1
12.5 TABLET ORAL DAILY
Status: DISCONTINUED | OUTPATIENT
Start: 2024-03-19 | End: 2024-03-19 | Stop reason: HOSPADM

## 2024-03-19 RX ORDER — NIFEDIPINE 60 MG/1
60 TABLET, EXTENDED RELEASE ORAL DAILY
Qty: 30 TABLET | Refills: 3
Start: 2024-03-20

## 2024-03-19 RX ADMIN — CARVEDILOL 25 MG: 25 TABLET, FILM COATED ORAL at 08:38

## 2024-03-19 RX ADMIN — NIFEDIPINE 60 MG: 60 TABLET, EXTENDED RELEASE ORAL at 08:36

## 2024-03-19 RX ADMIN — TAMSULOSIN HYDROCHLORIDE 0.4 MG: 0.4 CAPSULE ORAL at 08:36

## 2024-03-19 RX ADMIN — DULOXETINE HYDROCHLORIDE 30 MG: 30 CAPSULE, DELAYED RELEASE ORAL at 08:36

## 2024-03-19 RX ADMIN — HYDROCHLOROTHIAZIDE 12.5 MG: 25 TABLET ORAL at 08:36

## 2024-03-19 RX ADMIN — LAMOTRIGINE 100 MG: 100 TABLET ORAL at 08:38

## 2024-03-19 RX ADMIN — HYDRALAZINE HYDROCHLORIDE 50 MG: 50 TABLET, FILM COATED ORAL at 14:20

## 2024-03-19 RX ADMIN — HYDRALAZINE HYDROCHLORIDE 50 MG: 50 TABLET, FILM COATED ORAL at 06:05

## 2024-03-19 RX ADMIN — SODIUM CHLORIDE, PRESERVATIVE FREE 10 ML: 5 INJECTION INTRAVENOUS at 08:38

## 2024-03-19 RX ADMIN — POLYETHYLENE GLYCOL 3350 17 G: 17 POWDER, FOR SOLUTION ORAL at 13:12

## 2024-03-19 RX ADMIN — ENOXAPARIN SODIUM 40 MG: 100 INJECTION SUBCUTANEOUS at 08:35

## 2024-03-19 NOTE — CARE COORDINATION
Case Management Assessment            Discharge Note                    Date / Time of Note: 3/19/2024 1:38 PM                  Discharge Note Completed by: Claus Bills RN    Patient Name: Joe Wood   YOB: 1960  Diagnosis: Chest pain [R07.9]  Chest pain, unspecified type [R07.9]   Date / Time: 3/8/2024  3:35 PM    Current PCP: Caesar Mujica MD  Clinic patient: No    Hospitalization in the last 30 days: No       Advance Directives:  Code Status: Full Code  [place] DNR form completed and on chart: Not Indicated    Financial:  Payor: No [employer] / Plan: [employer] - CHOICE PLU / Product Type: *No Product type* /      Pharmacy:    [id number] [place], [place] - [address]  - P [phone] - F [phone] [address] [facility] [address]  Phone: [phone] Fax: [phone]      Assistance purchasing medications?: Potential Assistance Purchasing Medications: No  Assistance provided by Case Management: None at this time    Does patient want to participate in local refill/ meds to beds program?: No    Meds To Beds General Rules:  1. Can ONLY be done Monday- Friday between 8:30am-5pm  2. Prescription(s) must be in pharmacy by 3pm to be filled same day  3.Copy of patient's insurance/ prescription drug card and patient face sheet must be sent along with the prescription(s)  4. Cost of Rx cannot be added to hospital bill. If financial assistance is needed, please contact unit  or ;  or  CANNOT provide pharmacy voucher for patients co-pays  5. Patients can then  the prescription on their way out of the hospital at discharge, or pharmacy can deliver to the bedside if staff is available. (payment due at time of pick-up or delivery - cash, check, or card accepted)     Able to afford home medications/ co-pay costs: Yes    ADLS:  Current PT AM-PAC Score: 11 /24  Current OT AM-PAC Score: 15 /24      DISCHARGE

## 2024-03-19 NOTE — DISCHARGE SUMMARY
V2.0  Discharge Summary    Name:  Joe Wood /Age/Sex: 1960 (64 y.o. male)   Admit Date: 3/8/2024  Discharge Date: 3/19/24    MRN & CSN:  [id number] & [id number] Encounter Date and Time 3/19/24 1:29 PM EDT    Attending:  Rudi Gonzalez MD Discharging Provider: Rudi Gonzalez MD       Hospital Course:     Brief HPI: Joe Wood is a 64 y.o. male with PMH of prior CVA with residual R-sided weakness, seizure disorder, HTN who presented for evaluation of chest pain in setting of uncontrolled hypertension    Brief Problem Based Course:     Chest pain, resolved  Patient presented with central chest pain that began while completing physical therapy exercises. Possibly sec to MSK pain from exertion or sec to hypertensive emergency as SBP was in the 180s on presentation.   -Cardiology consulted and evaluated. Elevated troponin was felt to be demand mediated myocardial injury. No additional discomfort with conservative mgt and BP control  - Echo done: limited echo: mod concentric LVH, normal EF, Grade 1 DD  - No plans for ischemic evaluation at this time d/t intracranial hemorrhage/stroke     Resistant Hypertension  CKD IIIb  -Nephrology consulted and adjusted anti-hypertensives with improvement in BP.        Left temporal lobe epilepsy: POA  Patient had dysphagia and myoclonus of the right jaw while inpatient; possible seizure. Reviewed Neurology notes from recent  admission in Feb. 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 By time of discharge he was weaned off Oxcarbazepine and maintained on lamotrigine 100 mg bid.       Possible UTI with hx of recurrent UTIs  - UA on admission, showed bacteria and nitrite positive, although no signif pyuria  - Bladder scans to r/o retention which was an issue on admit at , and  Urology was consulted there

## 2024-03-21 NOTE — PROGRESS NOTES
Ph: [phone], Fax: [phone]                                     [facility]                                                   [address] [place], [place] [id number]           Reason for admission:    Chest pain           Brief Summary:     Joe Wood is being seen by nephrology for CKD and HTN    Interval History and Plan:     BP is better controlled  Urine is 2100 ml   Creatinine is improving 2.6 > 2.1     Continue current antihypertensives   Lasix X 1  Hyponatremia resolved        Thank you for allowing us to participate in this patient's care  In case of any question please call us at our 24 hour answering service [phone] or from 7 AM to 5 PM via Perfect Serve, Voalte, Epic chat or cell phone.   Dr Chau Anders MD          Assessment:     Chronic Kidney Disease  Cr around baseline 1.9- 2      Mild Hyponatremia: resolved       Hypertension:on antihypertensives       Chest pain      H/O CVA        HPI      Patient is a 64 y.o. 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       Patient seen at bedside with his wife and appear comfortable. Unable to speak properly due to H/O CVA. Chest pain is better. Denied SOB and on room air. Per wife patient appear little confused ? And he has recurrent UTIs and whenever he has UTI he is more confused.         ROS:   Negative except as mentioned in HPI      Vitals:     Vitals:    03/18/24 0430   BP: (!) 145/76   Pulse:    Resp:    Temp:    SpO2:          Physical Examination:     General appearance: NAD. Alert, oriented  Respiratory: No respiratory distress on room air.   Cardiovascular: Edema none.   Abdomen: Soft.   Other relevant findings:       Medications:       
                          Ph: [phone], Fax: [phone]                                     [facility]                                                   [address] [place], [place] [id number]           Reason for admission:    Chest pain           Brief Summary:     Joe Wood is being seen by nephrology for CKD and HTN    Interval History and Plan:     Patient seen at bedside  BP is better controlled   Urine is 750 ml  Na 129> 133> 138  Cr 1.9 >  2.2> 2.6    DC amlodipine and Diovan as GFR is worsening   Continue Nifedipine XL, Coreg, Clonidine and Hydralzine  Increase protein in diet  Avoid NSAIDs/Nephrotoxins      Thank you for allowing us to participate in this patient's care  In case of any question please call us at our 24 hour answering service [phone] or from 7 AM to 5 PM via Perfect Serve, Voalte, Epic chat or cell phone.   Dr Chau Anders MD      Assessment:     Chronic Kidney Disease  Cr around baseline      Mild Hyponatremia      Hypertension:      Chest pain      H/O CVA        HPI      Patient is a 64 y.o. 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       Patient seen at bedside with his wife and appear comfortable. Unable to speak properly due to H/O CVA. Chest pain is better. Denied SOB and on room air. Per wife patient appear little confused ? And he has recurrent UTIs and whenever he has UTI he is more confused.         ROS:   Negative except as mentioned in HPI      Vitals:     Vitals:    03/14/24 0517   BP: (!) 158/92   Pulse: 69   Resp: 16   Temp: 97.1 °F (36.2 °C)   SpO2: 96%         Physical Examination:     General appearance: NAD. Alert, oriented  Respiratory: No respiratory distress on room air. 
                          Ph: [phone], Fax: [phone]                                     [email or web address]                                                   [address] [place], [place] [id number]           Reason for admission:    Chest pain           Brief Summary:     Joe Wood is being seen by nephrology for CKD and HTN    Interval History and Plan:     Patient seen at bedside  BP is better controlled   Urine is 1930 ml +  Na 129> 133> 136  Cr 1.9 >  2.2> 2.5    Diovan stopped   Continue Nifedipine XL, Coreg, Clonidine and Hydralzine  High protein in diet  Avoid NSAIDs/Nephrotoxins  US of the kidneys is pending       Thank you for allowing us to participate in this patient's care  In case of any question please call us at our 24 hour answering service [phone] or from 7 AM to 5 PM via Perfect Serve, Voalte, Epic chat or cell phone.   Dr Chau Anders MD      Assessment:     Chronic Kidney Disease  Cr around baseline      Mild Hyponatremia      Hypertension:      Chest pain      H/O CVA        HPI      Patient is a 64 y.o. 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       Patient seen at bedside with his wife and appear comfortable. Unable to speak properly due to H/O CVA. Chest pain is better. Denied SOB and on room air. Per wife patient appear little confused ? And he has recurrent UTIs and whenever he has UTI he is more confused.         ROS:   Negative except as mentioned in HPI      Vitals:     Vitals:    03/15/24 0435   BP: 125/69   Pulse: 68   Resp: 18   Temp: 97.4 °F (36.3 °C)   SpO2: 96%         Physical Examination:     General appearance: NAD. Alert, oriented  Respiratory: No respiratory distress on room air. 
                          Ph: [phone], Fax: [phone]                                     [facility].[place] [id number]           Reason for admission:    Chest pain           Brief Summary:     Joe Wood is being seen by nephrology for CKD and HTN    Interval History and Plan:     The patient's serum creatinine was 2.6, slightly up since the presentation  The patient presented with a creatinine of 1.7  Urine output for yesterday was 1480 mL  According to the intake and output, the patient had negative balance for over 6.3 L  The patient presenting weight was 84.2 kg and today body weight was reported as 85.2 kg therefore the patient not dry based on her weight.  The patient does not have any edema  The patient blood pressure is 130/87  According to patient's wife who is at his bedside bladder scan showed a volume only about 150 mL  Reviewed his medication list and I did not see any concerning medications  The patient was started on Rocephin yesterday  Intervention needed from renal staff for today.    The patient was seen and examined in his room with his wife at his bedside the patient's face was very red.  The patient wife, the patient has red-faced urine at nighttime not new to him in general  The patient was verbal, but mentation and speech is slowed  The patient no pedal edema  The patient most recent set of vital signs showed temperature 97.2 heart rate 66 and blood pressure 138/87    Lab work from this morning showed sodium 137 potassium 3.9 bicarb 29 BUN 41 creatinine was 2.6 glucose 101 calcium 9.5 phosphorus 4.7    WBC 5.7 hemoglobin 13.4 and the platelet was 2023.      Thank you for allowing us to participate in this patient's care  In case of any question please call us at our 24 hour answering service [phone] or from 7 AM to 5 PM via Perfect Serve, Voalte, Epic chat or cell phone.   Dr Suzna Louis, 
                          Ph: [phone], Fax: [phone]                                     [facility] [facility]                                                   [address] [place], [place] [id number]           Reason for admission:    Chest pain           Brief Summary:     Joe Wood is being seen by nephrology for CKD and HTN    Interval History and Plan:     Patient seen at bedside  BP is better controlled but still high  Urine is 2050 ml  Na 129> 133> 134  Cr 1.9 >  2.2> 2.5      DC amlodipine and Diovan as GFR is worsening   Started Nifedipine XL and continue other BP medicines  D/C IVF   Bladder scan for retention   Increase protein in diet  Avoid NSAIDs/Nephrotoxins      Thank you for allowing us to participate in this patient's care  In case of any question please call us at our 24 hour answering service [phone] or from 7 AM to 5 PM via Perfect Serve, Voalte, Epic chat or cell phone.   Dr Chau Anders MD      Assessment:     Chronic Kidney Disease  Cr around baseline      Mild Hyponatremia      Hypertension:      Chest pain      H/O CVA        HPI      Patient is a 64 y.o. 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       Patient seen at bedside with his wife and appear comfortable. Unable to speak properly due to H/O CVA. Chest pain is better. Denied SOB and on room air. Per wife patient appear little confused ? And he has recurrent UTIs and whenever he has UTI he is more confused.         ROS:   Negative except as mentioned in HPI      Vitals:     Vitals:    03/12/24 0427   BP: (!) 154/83   Pulse: 66   Resp: 18   Temp: 97.4 °F (36.3 °C)   SpO2: 98%         Physical Examination:     General appearance: NAD. Alert, 
                          Ph: [phone], Fax: [phone]                                     [facility]                                                   [address] [place], [place] [id number]           Reason for admission:    Chest pain           Brief Summary:     Joe Wood is being seen by nephrology for CKD and HTN    Interval History and Plan:   Patient seen at bedside  Comfortable  Having breakfast   No GI symptoms.   /90  On room air  Na 133  Cr 1.9 >  2.2      Cr around his baseline but trend it.   Oral Urea x 1   Increased Valsartan dose yesterday. So will monitor for today. Continue Coreg, Amlodipine, Clonidine patch.   D/C IVF and follow BP  Increase protein in diet  Avoid NSAIDs/Nephrotoxins      Thank you for allowing us to participate in this patient's care  In case of any question please call us at our 24 hour answering service [phone] or from 7 AM to 5 PM via Perfect Serve, Voalte, Epic chat or cell phone.   Dr Chau Anders MD      Assessment:     Chronic Kidney Disease  Cr around baseline      Mild Hyponatremia      Hypertension:      Chest pain      H/O CVA        HPI      Patient is a 64 y.o. 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       Patient seen at bedside with his wife and appear comfortable. Unable to speak properly due to H/O CVA. Chest pain is better. Denied SOB and on room air. Per wife patient appear little confused ? And he has recurrent UTIs and whenever he has UTI he is more confused.         ROS:   Negative except as mentioned in HPI      Vitals:     Vitals:    03/10/24 0406   BP: (!) 158/90   Pulse: 83   Resp: 18   Temp: 98 °F (36.7 °C)   SpO2: 95%         Physical Examination: 
                          Speech Language Pathology  Facility/Department:[facility] PCU  Dysphagia Evaluation/[place]  Name: Joe Wood  : 1960  MRN: [id number]                                                         Patient Diagnosis(es):   Patient Active Problem List    Diagnosis Date Noted    Chest pain 2024       History reviewed. No pertinent past medical history.    History reviewed. No pertinent surgical history.    Reason for Referral:  Joe Wood  was referred for a Speech Therapy evaluation to assess swallow function and/or communication.    History of Present Illness  Per MD notes:  64 y.o. male who presented to [facility] with chest pain.  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  Patient has been in and out of .  Patient has been in and out of control multiple times over the last few months since 2023 when he had intracranial hemorrhage and then had seizures and stroke after that.  His recent hospitalization was at [facility] where patient required multiple adjustment of his seizure medication and recently was started on Lamictal with a plan for gradual taper up of his dose to help with his focal seizure.  Also he had issues with uncontrolled hypertension required multiple adjustment of blood pressure medication.  Patient has been at skilled nursing facility for rehab over the last couple of weeks and according to the wife who provided most of the information has been struggling with controlling blood pressure, when patient woke up this morning he ate his breakfast but then when physical therapy started to work with the patient and his blood pressure was elevated so they canceled the treatment, patient went back to his room and he took a nap and did not eat lunch which according to the wife is not normal 
             Speech Language Pathology  Facility/Department:[facility]  Dysphagia Treatment  Name: Joe Wood  : 1960  MRN: [id number]                                                         Patient Diagnosis(es):   Patient Active Problem List    Diagnosis Date Noted    Uncontrolled hypertension 2024    Non-cardiac chest pain 2024       History reviewed. No pertinent past medical history.    History reviewed. No pertinent surgical history.    Reason for Referral:  Joe Wood  was referred for a Speech Therapy evaluation to assess swallow function and/or communication.    History of Present Illness  Per MD notes:  64 y.o. male who presented to [facility] with chest pain.  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  Patient has been in and out of .  Patient has been in and out of control multiple times over the last few months since 2023 when he had intracranial hemorrhage and then had seizures and stroke after that.  His recent hospitalization was at [facility] where patient required multiple adjustment of his seizure medication and recently was started on Lamictal with a plan for gradual taper up of his dose to help with his focal seizure.  Also he had issues with uncontrolled hypertension required multiple adjustment of blood pressure medication.  Patient has been at skilled nursing facility for rehab over the last couple of weeks and according to the wife who provided most of the information has been struggling with controlling blood pressure, when patient woke up this morning he ate his breakfast but then when physical therapy started to work with the patient and his blood pressure was elevated so they canceled the treatment, patient went back to his room and he took a nap and did not eat lunch which 
             Speech Language Pathology  Facility/Department:[facility] PCU  Dysphagia Treatment  Name: Joe Wood  : 1960  MRN: [id number]                                                     Patient Diagnosis(es):   Patient Active Problem List    Diagnosis Date Noted    Uncontrolled hypertension 2024    Non-cardiac chest pain 2024       History reviewed. No pertinent past medical history.    History reviewed. No pertinent surgical history.    History of Present Illness  Per MD notes:  64 y.o. male who presented to [facility] with chest pain.  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  Patient has been in and out of .  Patient has been in and out of control multiple times over the last few months since 2023 when he had intracranial hemorrhage and then had seizures and stroke after that.  His recent hospitalization was at [facility] where patient required multiple adjustment of his seizure medication and recently was started on Lamictal with a plan for gradual taper up of his dose to help with his focal seizure.  Also he had issues with uncontrolled hypertension required multiple adjustment of blood pressure medication.  Patient has been at skilled nursing facility for rehab over the last couple of weeks and according to the wife who provided most of the information has been struggling with controlling blood pressure, when patient woke up this morning he ate his breakfast but then when physical therapy started to work with the patient and his blood pressure was elevated so they canceled the treatment, patient went back to his room and he took a nap and did not eat lunch which according to the wife is not normal for him, when he woke up he complained of chest pain it was on the mid chest but no radiation to arm 
         Hospitalist Progress Note      Name:  Joe Wood /Age/Sex: 1960  (64 y.o. male)   MRN & CSN:  [id number] & [id number] Admission Date/Time: 3/8/2024  3:35 PM   Location:  [id number] PCP: Caesar Mujica MD         Hospital Day: 9    Assessment and Plan:      Chest pain  - Patient presented with central chest pain that began while completing physical therapy exercises. Possibly sec to MSK pain from exertion or sec to hypertensive emergency as SBP was in the 180s on presentation.   - No additional discomfort with conservative mgt and BP control   - Elevated troponin was felt by Cardiology to be demand mediated myocardial injury. This may have been from  uncontrolled HTN. Abnormal ECG: non-specific tw abl: new change compared to Feb ECG at . MI considered ruled out. Not POA.   - Echo done: limited echo: mod concentric LVH, normal EF, Grade 1 DD  - No plans for ischemic evaluation at this time d/t intracranial hemorrhage/stroke     Resistant Hypertension: POA  Hypertensive emergency on admission  - Patient presented with central chest pain that began while completing physical therapy exercises. Possibly sec to MSK pain from exertion or sec to hypertensive emergency as SBP was in the 180s on presentation.   - No additional discomfort with conservative mgt and BP control   - BP control improved on current meds as adjusted  - Continue monitoring     CKD stage IIIb: creat around 2.3 last admission at , now around 2.6, avoid nephrotoxins.      Left temporal lobe epilepsy: POA  - Patient with dysphagia and myoclonus of the right jaw; possible seizure   - Reviewed Neurology notes from recent  admission in Feb. 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   - Appears patient was taking oxcarbazepine 300 mg BID at home, but dose increased on admission (600 mg BID) and supplemented with ativan (-) to break EPC. Due to hyponatremia, 
         Hospitalist Progress Note      Name:  Joe Wood /Age/Sex: 1960  (64 y.o. male)   MRN & CSN:  [id number] & [id number] Admission Date/Time: 3/8/2024  3:35 PM   Location:  [facility]4302-01 PCP: Caesar Mujica MD         Hospital Day: 11    Assessment and Plan:      Chest pain, resolved  Patient presented with central chest pain that began while completing physical therapy exercises. Possibly sec to MSK pain from exertion or sec to hypertensive emergency as SBP was in the 180s on presentation. Elevated troponin was felt by Cardiology to be demand mediated myocardial injury. This may have been from  uncontrolled HTN. Abnormal ECG: non-specific tw abl: new change compared to Feb ECG at . MI considered ruled out. No additional discomfort with conservative mgt and BP control  - Echo done: limited echo: mod concentric LVH, normal EF, Grade 1 DD  - No plans for ischemic evaluation at this time d/t intracranial hemorrhage/stroke     Resistant Hypertension: POA  Hypertensive emergency on admission  Patient presented with central chest pain that began while completing physical therapy exercises. Possibly sec to MSK pain from exertion or sec to hypertensive emergency as SBP was in the 180s on presentation. No additional discomfort with conservative mgt and BP control   - BP control improved on current meds as adjusted. Mildly elevated today, but no change as it sometimes drop significantly.   - Continue monitoring     CKD stage IIIb: creat around 2.3 last admission at , now around 2.6, avoid nephrotoxins.      Left temporal lobe epilepsy: POA  Patient with dysphagia and myoclonus of the right jaw; possible seizure. Reviewed Neurology notes from recent  admission in Feb. 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  Appears patient was taking oxcarbazepine 300 mg BID at home, but dose increased on admission (600 mg BID) and supplemented with 
         Hospitalist Progress Note      Name:  Joe Wood /Age/Sex: 1960  (64 y.o. male)   MRN & CSN:  [id number] & [id number] Admission Date/Time: 3/8/2024  3:35 PM   Location:  [facility]302-01 PCP: Caesar Mujica MD         Hospital Day: 10    Assessment and Plan:      Chest pain  - Patient presented with central chest pain that began while completing physical therapy exercises. Possibly sec to MSK pain from exertion or sec to hypertensive emergency as SBP was in the 180s on presentation.   - No additional discomfort with conservative mgt and BP control   - Elevated troponin was felt by Cardiology to be demand mediated myocardial injury. This may have been from  uncontrolled HTN. Abnormal ECG: non-specific tw abl: new change compared to Feb ECG at . MI considered ruled out. Not POA.   - Echo done: limited echo: mod concentric LVH, normal EF, Grade 1 DD  - No plans for ischemic evaluation at this time d/t intracranial hemorrhage/stroke     Resistant Hypertension: POA  Hypertensive emergency on admission  - Patient presented with central chest pain that began while completing physical therapy exercises. Possibly sec to MSK pain from exertion or sec to hypertensive emergency as SBP was in the 180s on presentation.   - No additional discomfort with conservative mgt and BP control   - BP control improved on current meds as adjusted. Mildly elevated today, but no change as it sometimes drop significantly.   - Continue monitoring     CKD stage IIIb: creat around 2.3 last admission at , now around 2.6, avoid nephrotoxins.      Left temporal lobe epilepsy: POA  - Patient with dysphagia and myoclonus of the right jaw; possible seizure   - Reviewed Neurology notes from recent  admission in Feb. 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   - Appears patient was taking oxcarbazepine 300 mg BID at home, but dose increased on admission (600 mg 
       Speech Language Pathology  Facility/Department:[facility]  Dysphagia Treatment    Name: Joe Wood  : 1960  MRN: [id number]                                                     Patient Diagnosis(es):   Patient Active Problem List    Diagnosis Date Noted    History of cerebellar hemorrhage 2024    Partial symptomatic epilepsy with simple partial seizures, intractable, without status epilepticus (HCC) 2024    Uncontrolled hypertension 2024    Non-cardiac chest pain 2024     History reviewed. No pertinent past medical history.    History reviewed. No pertinent surgical history.    History of Present Illness  Per MD notes:  64 y.o. male who presented to [facility] with chest pain.  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  Patient has been in and out of .  Patient has been in and out of control multiple times over the last few months since 2023 when he had intracranial hemorrhage and then had seizures and stroke after that.  His recent hospitalization was at [facility] where patient required multiple adjustment of his seizure medication and recently was started on Lamictal with a plan for gradual taper up of his dose to help with his focal seizure.  Also he had issues with uncontrolled hypertension required multiple adjustment of blood pressure medication.  Patient has been at skilled nursing facility for rehab over the last couple of weeks and according to the wife who provided most of the information has been struggling with controlling blood pressure, when patient woke up this morning he ate his breakfast but then when physical therapy started to work with the patient and his blood pressure was elevated so they canceled the treatment, patient went back to his room and he took a nap and 
       Speech Language Pathology  Facility/Department:[facility]  Dysphagia Treatment    Name: Joe Wood  : 1960  MRN: [id number]                                                     Patient Diagnosis(es):   Patient Active Problem List    Diagnosis Date Noted    Uncontrolled hypertension 2024    Non-cardiac chest pain 2024     History reviewed. No pertinent past medical history.    History reviewed. No pertinent surgical history.    History of Present Illness  Per MD notes:  64 y.o. male who presented to [facility] with chest pain.  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  Patient has been in and out of .  Patient has been in and out of control multiple times over the last few months since 2023 when he had intracranial hemorrhage and then had seizures and stroke after that.  His recent hospitalization was at [facility] where patient required multiple adjustment of his seizure medication and recently was started on Lamictal with a plan for gradual taper up of his dose to help with his focal seizure.  Also he had issues with uncontrolled hypertension required multiple adjustment of blood pressure medication.  Patient has been at skilled nursing facility for rehab over the last couple of weeks and according to the wife who provided most of the information has been struggling with controlling blood pressure, when patient woke up this morning he ate his breakfast but then when physical therapy started to work with the patient and his blood pressure was elevated so they canceled the treatment, patient went back to his room and he took a nap and did not eat lunch which according to the wife is not normal for him, when he woke up he complained of chest pain it was on the mid chest but no radiation to arm or neck 
    V2.0    [facility] [place] Progress Note      Name:  Joe Wood /Age/Sex: 1960  (64 y.o. male)   MRN & CSN:  [id number] & [id number] Encounter Date/Time: 3/13/2024 10:01 AM EST   Location:  [address] [place] [address] PCP: Caesar Mujica MD     Attending:Paul Vleasquez MD       Hospital Day: 6    Assessment and Recommendations       Assessment/Plan:       LANCE on CKD:  Reviewed note from nephrology.  Concerned some blood pressure medications may be affecting renal function.  Plan amlodipine and Diovan have been discussed continue and starting nifedipine XL 60 mg daily 3/11.  Continue Coreg 25 twice a day and clonidine patch 0.2 mg per 24 hours.  Reviewed note from nephrology:   Will check bladder scan   Continue to trend renal function in a.m. currently cr 2.5 as of yesterday  Case discussed with Dr. Anders and we are waiting on labs from this morning    Hypertension:  Patient has ongoing issues getting his blood pressure control  Will restart patient's clonidine 0.2 mg patch  Trend blood pressure can increase valsartan to 320 mg if blood pressure continues to be elevated  Currently continuing home medications amlodipine 10 mg daily, Coreg 25 mg 2 times a day,   3/9:Diovan was 160 will be increased to 320 mg tomorrow and clonidine 0.2 mg/24h patch  Blood pressure currently 145/79 in our improving with current management    Hyponatremia:  130 last evening  Yesterday 134  A.m. labs pending    Dysphagia:  Modified barium swallow today  adjust diet trial thin liquids    Chest pain:  No additional discomfort  Troponin trending down EKG was unchanged  No additional workup is indicated after reviewing cardiology note    Tachycardia: Resolved   EKG reviewed by me SR no acute injury pattern  W/u negative Cxr, abd, UA   CBC, renal ok.  No fever  d-dimer negative    Right cerebellar intracranial hemorrhage():  And multifocal cortical/subcortical microhemorrhages consistent with CAA(cerebral amyloid angiopathic)   patient had 
    V2.0    [facility] [place] Progress Note      Name:  Joe Wood /Age/Sex: 1960  (64 y.o. male)   MRN & CSN:  [id number] & [id number] Encounter Date/Time: 3/9/2024 10:01 AM EST   Location:  [address] [place] [address] PCP: Caesar Mujica MD     Attending:Paul Velasquez MD       Hospital Day: 2    Assessment and Recommendations       Assessment/Plan:     Chest pain:  No additional discomfort  Troponin trending down EKG was unchanged  No additional workup is indicated after reviewing cardiology note    Hypertension:  Patient has ongoing issues getting his blood pressure control  Will restart patient's clonidine 0.2 mg patch  Trend blood pressure can increase valsartan to 320 mg if blood pressure continues to be elevated  Currently continuing home medications amlodipine 10 mg daily, Coreg 25 mg 2 times a day, Diovan was 160 will be increased to 320 mg tomorrow and clonidine 0.2 mg/24h patch    Chronic kidney disease stage III:  Asked nephrology to see patient  Reviewing old records baseline creatinine 1.5-2.0    Right cerebellar intracranial hemorrhage():  And multifocal cortical/subcortical microhemorrhages consistent with CAA(cerebral amyloid angiopathic)   patient had another stroke left parietal  With resultant complications of left temporal lobe epilepsy and dementia  Concern regarding patient's dysphagia we will have speech therapy see him    Recurrent UTI:  Currently does not appear to have a UTI  UA was unremarkable  Patient was started on Flomax and and per wife has reduced the number of UTIs he is having    Disposition: Discussed with wife, blood pressure stabilizes anticipate discharge back to [place] tomorrow    1340:  Contacted because of patient's blood pressure being elevated and patient being diaphoretic.  Patient apparently is also nauseated.  Went and reexamined the patient.  Wife says that he is nauseated but has not thrown up.  Lungs remain clear  Heart regular rate and rhythm  Abdomen is 
    V2.0    [facility] [place]      Name:  Joe Wood /Age/Sex: 1960  (64 y.o. male)   MRN & CSN:  [id number] & [id number] Encounter Date/Time: 3/12/2024 10:01 AM EST   Location:  [facility] PCP: Caesar Mujica MD     Attending:Paul Velasquez MD       Hospital Day: 5    Assessment and Recommendations       Assessment/Plan:       LANCE on CKD:  Reviewed note from nephrology.  Concerned some blood pressure medications may be affecting renal function.  Plan amlodipine and Diovan have been discussed continue and starting nifedipine XL 60 mg daily 3/11.  Continue Coreg 25 twice a day and clonidine patch 0.2 mg per 24 hours.  Reviewed note from nephrology:   Will check bladder scan   Continue to trend renal function in a.m. currently cr 2.5    Hypertension:  Patient has ongoing issues getting his blood pressure control  Will restart patient's clonidine 0.2 mg patch  Trend blood pressure can increase valsartan to 320 mg if blood pressure continues to be elevated  Currently continuing home medications amlodipine 10 mg daily, Coreg 25 mg 2 times a day,   3/9:Diovan was 160 will be increased to 320 mg tomorrow and clonidine 0.2 mg/24h patch  Blood pressure is improved 154/83 currently    Hyponatremia:  130 last evening  Repeat this   trend    Dysphagia:  Modified barium swallow today  Discussed with speech therapy adjust diet trial thin liquids    Chest pain:  No additional discomfort  Troponin trending down EKG was unchanged  No additional workup is indicated after reviewing cardiology note    Tachycardia: Resolved   EKG reviewed by me SR no acute injury pattern  W/u negative Cxr, abd, UA   CBC, renal ok.  No fever  d-dimer negative    Right cerebellar intracranial hemorrhage():  And multifocal cortical/subcortical microhemorrhages consistent with CAA(cerebral amyloid angiopathic)   patient had another stroke left parietal  With resultant complications of left temporal lobe epilepsy and 
    V2.0    [facility] [place]      Name:  Joe Wood /Age/Sex: 1960  (64 y.o. male)   MRN & CSN:  [id number] & [id number] Encounter Date/Time: 3/11/2024 10:01 AM EST   Location:  [facility] PCP: Caesar Mujica MD     Attending:Paul Velasquez MD       Hospital Day: 4    Assessment and Recommendations       Assessment/Plan:       LANCE on CKD:  Reviewed note from nephrology.  Concerned some blood pressure medications may be affecting renal function.  Plan amlodipine and Diovan have been discussed continue and starting nifedipine XL 60 mg daily 3/11.  Continue Coreg 25 twice a day and clonidine patch 0.2 mg per 24 hours.  Will trend renal function in a.m.    Hypertension:  Patient has ongoing issues getting his blood pressure control  Will restart patient's clonidine 0.2 mg patch  Trend blood pressure can increase valsartan to 320 mg if blood pressure continues to be elevated  Currently continuing home medications amlodipine 10 mg daily, Coreg 25 mg 2 times a day,   3/9:Diovan was 160 will be increased to 320 mg tomorrow and clonidine 0.2 mg/24h patch  Blood pressure is improved 149/89 currently    Hyponatremia:  130 last evening  Repeat this   Slowly improving continue to trend    Dysphagia:  Modified barium swallow today  Discussed with speech therapy await results and recommendations    Chest pain:  No additional discomfort  Troponin trending down EKG was unchanged  No additional workup is indicated after reviewing cardiology note    Tachycardia: Resolved   EKG reviewed by me SR no acute injury pattern  W/u negative Cxr, abd, UA   CBC, renal ok.  No fever  d-dimer negative    Right cerebellar intracranial hemorrhage():  And multifocal cortical/subcortical microhemorrhages consistent with CAA(cerebral amyloid angiopathic)   patient had another stroke left parietal  With resultant complications of left temporal lobe epilepsy and dementia  Concern regarding patient's dysphagia we will 
  Physician Progress Note      PATIENT:               SONYA MAKI  CSN #:                  [id number]  :                       1960  ADMIT DATE:       3/8/2024 3:35 PM  DISCH DATE:  RESPONDING  PROVIDER #:        Paul Velasquez MD          QUERY TEXT:    Pt admitted with Chest pain/uncontrolled hypertension. If possible, please   document in progress notes and discharge summary if you are evaluating and/or   treating any of the following:    The medical record reflects the following:  Risk Factors: LANCE on CKD, previous CVA  Clinical Indicators: 3/8-(V/S) SBP [id number]; 3/11 MD \"LANCE on CKD\"  Treatment: CBC, CMP, 12-lead EKG, cardiac profile, Cardiology/Nephrology   consults, Meds-Norvasc, Clonidine, Nifedipime,  Diovan, Hydralazine prn, V/S and I/O monitoring per unit protocol    Hypertensive Urgency: Defined as SBP of >180 OR DBP >120 w/o associated organ   damage. S/s may or may not be present, but can include severe headache, SOB,   epistaxis, severe anxiety. Tx: adjustment of oral BP medication; IV meds not   usually required.  Hypertensive Emergency: SBP of >180 OR DBP >120 w/ associated organ damage   (CVA, MI, acute CHF, LANCE, encephalopathy, pulmonary edema, and unstable   angina). Documentation should include specific organ affected.  Requires   immediate treatment, usually with IV meds.  Hypertensive Crisis, unspecified: SBP of > 180 OR DBP > 120 and includes   damage to blood vessels, including inflammation, leakage of fluid or blood and   can cause stroke, headache, heart failure and eclampsia.  Source: [employer] [place]-DR[employer] Training Guide and Quick Reference Guide  Options provided:  -- Hypertensive Crisis  -- Hypertensive Emergency  -- Hypertensive Urgency  -- Other - I will add my own diagnosis  -- Disagree - Not applicable / Not valid  -- Disagree - Clinically unable to determine / Unknown  -- Refer to Clinical Documentation Reviewer    PROVIDER RESPONSE TEXT:    This patient has hypertensive 
  Physician Progress Note      PATIENT:               SONYA MAKI  CSN #:                  [id number]  :                       1960  ADMIT DATE:       3/8/2024 3:35 PM  DISCH DATE:        3/19/2024 4:15 PM  RESPONDING  PROVIDER #:        Harinder Sawnson MD          QUERY TEXT:    Pt admitted with HTN urgency and has \" metabolic encephalopathy\" on 3/15/24   documented. If possible, please document in progress notes and discharge if :      The medical record reflects the following:  Risk Factors: hyponatremia  Clinical Indicators: 3/8-Gs=000 mmol/L, MD notes 3/15 \"Acute metabolic   encephalopathy\"; 3/18- \"Has been noted?3/15/24?by  wife with intermittent episodes of confusion. He is back to his baseline   now.\"; 3/15 Urine culture (no growth)  Treatment: CBC, CMP, U/A, Nephrology consult, Meds- cont. IVF NS @ 125 ml   hour, V/S and I/O monitoring per unit protocol  Options provided:  -- Metabolic encephalopathy due to hyponatremia  -- Other - I will add my own diagnosis  -- Disagree - Not applicable / Not valid  -- Disagree - Clinically unable to determine / Unknown  -- Refer to Clinical Documentation Reviewer    PROVIDER RESPONSE TEXT:    This patient has metabolic encephalopathy due to hyponatremia.    Query created by: Rosa Swanson on 3/18/2024 10:47 AM      Electronically signed by:  Harinder Swanson MD 3/21/2024 11:52 AM          
/88 after giving PRN hydralazine.  However, HR elevating into 110s.  Dr. Velasquez notified and new order received.   
4 Eyes Skin Assessment     NAME:  Joe Wood  YOB: 1960  MEDICAL RECORD NUMBER:  [id number]    The patient is being assessed for  Admission    I agree that at least one RN has performed a thorough Head to Toe Skin Assessment on the patient. ALL assessment sites listed below have been assessed.      Areas assessed by both nurses:    Head, Face, Ears, Shoulders, Back, Chest, Arms, Elbows, Hands, Sacrum. Buttock, Coccyx, Ischium, Legs. Feet and Heels, and Under Medical Devices         Does the Patient have a Wound? No noted wound(s)  Blanching redness b/l heels  Blanching redness coccyx       Lopez Prevention initiated by RN: Yes  Wound Care Orders initiated by RN: No    Pressure Injury (Stage 3,4, Unstageable, DTI, NWPT, and Complex wounds) if present, place Wound referral order by RN under : No    New Ostomies, if present place, Ostomy referral order under : No     Nurse 1 eSignature: Electronically signed by Axel Bueno RN on 3/9/24 at 6:01 AM EST    **SHARE this note so that the co-signing nurse can place an eSignature**    Nurse 2 eSignature: Electronically signed by Bridgett Cowan RN on 3/8/24 at 11:28 PM EST    
[facility]  General Cardiology Progress Note    CC: chest pain, uncontrolled HTN  HPI:     S: Denies cp or sob.     Tele: Sinus     O:  Physical Exam:  BP (!) 145/79   Pulse 74   Temp 97.7 °F (36.5 °C) (Oral)   Resp 17   Ht 1.905 m (6' 3\")   Wt 84.4 kg (186 lb 1.1 oz)   SpO2 96%   BMI 23.26 kg/m²    General (appearance):  No acute distress  Eyes: anicteric   Neck: soft, No JVD  Ears/Nose/Mouth/Thorat: No cyanosis  CV: RRR   Respiratory:  normal effort  GI: soft, non-tender, non-distended  Skin: Warm, dry. No rashes  Neuro/Psych: Alert and cooperative. Appropriate behavior  Ext:  No c/c.   Pulses:  2+ radial     I.O's= -4.2 L     Weight  Admission: Weight - Scale: 84.2 kg (185 lb 9.6 oz)   Today: Weight - Scale: 84.4 kg (186 lb 1.1 oz)    CBC:   Recent Labs     24   WBC 7.7   HGB 13.5   HCT 40.0*   MCV 86.8        BMP:   Recent Labs     24  0444 24  0531   * 134*   K 4.7 4.8   CL 98* 97*   CO2 25 27   PHOS 4.7 4.2   BUN 41* 37*   CREATININE 2.4* 2.5*     Estimated Creatinine Clearance: 36 mL/min (A) (based on SCr of 2.5 mg/dL (H)).    Mag:   Lab Results   Component Value Date/Time    MG 1.80 2024 03:53 PM     High Sensitivity Troponin:   Lab Results   Component Value Date    TROPHS 25 (H) 2024       Imagin2024 Echo  - Left ventricle: The cavity size is normal. Wall thickness was increased in     a pattern of severe LVH. Systolic function is normal. The estimated     ejection fraction is 60-65%. Wall motion is normal; there are no regional     wall motion abnormalities. There is a mild intracavitary obstruction     gradient with a peak velocity in the range of 2.9 m/s. Grade I diastolic     dysfunction.   - Aortic valve: There is mild thickening. There is mild calcification.   - Mitral valve: The annulus is calcified.   - Left atrium: No obvious right to left shunt with saline contrast, but     technically difficult images limit sensitivity. 
Clinical Pharmacy Consult Note  Medication History     Admit Date: 3/8/2024    List of of current medications patient is taking is complete. Home Medication list in EPIC updated to reflect changes noted below.    Source of information: I viewed his dispense report and used his nursing home transfer papers.    Patient's home pharmacy: [facility] [id number] [place] [id number] - P [phone] - F [phone]     Changes made to medication list:   Medications removed: (include reason, ex: therapy completed, patient no longer taking, etc.)  Bisacodyl- Not on transfer papers  Medications added:   Tylenol  Benadryl  Cerovite  Clonidine  Duloxetine  ergocalciferol  Ferretts  Folic acid  Keflex  Ketoconazole cream  Ketoconazole shampoo  Lamotrigine  Loratadine  Losartan  Melatonin  Mucinex  Oxcarbazepine  Phytoplex  Miralax  Senokot S  Tamsulosin  Thiamine  Medication doses adjusted:   None    NOTE-- LAMOTRIGINE- UP-TITRATION per NH list  3/4 --->3/10   - 50 mg in morning, 75 mg nightly  3/11--> 3/17  -  75 mg BID  3/18 --> 3/24  - 75 mg in morning,  100 mg nightly  3/25- ---->       - 100 mg BID    Current Outpatient Medications   Medication Instructions    acetaminophen (TYLENOL) 650 mg, Oral, EVERY 4 HOURS PRN, (no more than 3 grams per day)    albuterol sulfate HFA (PROVENTIL;VENTOLIN;PROAIR) 108 (90 Base) MCG/ACT inhaler 2 puffs, Inhalation, EVERY 6 HOURS PRN    amLODIPine (NORVASC) 10 mg, Oral, DAILY    carvedilol (COREG) 12.5 mg, Oral, 2 TIMES DAILY    cephALEXin (KEFLEX) 250 mg, Oral, DAILY    cloNIDine (CATAPRES-TTS-3) 0.3 MG/24HR PTWK 1 patch, TransDERmal, WEEKLY, (on Wednesdays)    Cyanocobalamin 1,000 mcg, IntraMUSCular, EVERY 30 DAYS    diphenhydrAMINE (BENADRYL) 25 mg, Oral, DAILY PRN    DULoxetine (CYMBALTA) 30 mg, Oral, DAILY    ergocalciferol (ERGOCALCIFEROL) 50,000 Units, Oral, WEEKLY, (on Wednesdays)    Ferrous Fumarate (FERRETTS) 325 (106 Fe) MG TABS 1 tablet, Oral, DAILY    folic 
Dysphagia - Soft and Bite Sized Low Sodium (2 gm)  Mildly Thick (Nectar)  Like juices,  dislike milk    Feeding Supervision and set up needed  Single sips with liquid  Chin tuck  Meds whole, one at a time in pudding or applesauce.          
[facility]  General Cardiology Progress Note    CC: chest pain, uncontrolled HTN  HPI:     S: Denies cp or sob.     Tele: Sinus     O:  Physical Exam:  BP (!) 141/77   Pulse 64   Temp 97.7 °F (36.5 °C) (Temporal)   Resp 18   Ht 1.905 m (6' 3\")   Wt 81.9 kg (180 lb 8.9 oz)   SpO2 96%   BMI 22.57 kg/m²    General (appearance):  No acute distress  Eyes: anicteric   Neck: soft, No JVD  Ears/Nose/Mouth/Thorat: No cyanosis  CV: RRR   Respiratory:  normal effort  GI: soft, non-tender, non-distended  Skin: Warm, dry. No rashes  Neuro/Psych: Alert and cooperative. Appropriate behavior  Ext:  No c/c.   Pulses:  2+ radial     I.O's= -4.6 L     Weight  Admission: Weight - Scale: 84.2 kg (185 lb 9.6 oz)   Today: Weight - Scale: 81.9 kg (180 lb 8.9 oz)    CBC:   No results for input(s): \"WBC\", \"HGB\", \"HCT\", \"MCV\", \"PLT\" in the last 72 hours.    BMP:   Recent Labs     24  0531 24  0959 24  0505   * 137 138   K 4.8 4.0 4.0   CL 97* 97* 100   CO2 27 25 29   PHOS 4.2 3.7 4.2   BUN 37* 31* 32*   CREATININE 2.5* 2.4* 2.6*     Estimated Creatinine Clearance: 34 mL/min (A) (based on SCr of 2.6 mg/dL (H)).    Mag:   Lab Results   Component Value Date/Time    MG 1.80 2024 03:53 PM     High Sensitivity Troponin:   Lab Results   Component Value Date    TROPHS 25 (H) 2024       Imagin2024 Echo  - Left ventricle: The cavity size is normal. Wall thickness was increased in     a pattern of severe LVH. Systolic function is normal. The estimated     ejection fraction is 60-65%. Wall motion is normal; there are no regional     wall motion abnormalities. There is a mild intracavitary obstruction     gradient with a peak velocity in the range of 2.9 m/s. Grade I diastolic     dysfunction.   - Aortic valve: There is mild thickening. There is mild calcification.   - Mitral valve: The annulus is calcified.   - Left atrium: No obvious right to left shunt with saline contrast, but     
No acute events this shift. Pt repositioned q 2 hrs to prevent skin breakdown. Pt had one, large, soft BM. Denies pain. VS and shift assessments WDL. Call light is in reach. Bed alarm is engaged.  
Nutrition Note       NUTRITION RECOMMENDATIONS:   1. PO Diet: Continue current diet, soft & bite-sized, 2 g Na per SLP  2. ONS: Available PRN    NUTRITION ASSESSMENT:  Nutritional summary & status: LOS: Patient w/ consistent >75% intakes during admit on soft & bite-sized per SLP. Limited wt hx per EMR, wt stable per EMR prior to admission. -5# since admit, also -6L. Last BM 3/15. Plan to d/c to rehab.   Admission/PMH: non-cardiac chest pain // CVA, HTN, early dementia, ICH, CKDIII    MALNUTRITION ASSESSMENT  Context of Malnutrition: Acute Illness   Malnutrition Status: No malnutrition    NUTRITION DIAGNOSIS   No nutrition diagnosis at this time    NUTRITION INTERVENTION  Food and/or Nutrient Delivery:  Continue Current Diet  Nutrition Education/Counseling:  Education not indicated       The patient will still be monitored per nutrition standards of care.  Consult dietitian if nutrition interventions essential to patient care is needed.     Delma Hui RD  [place]:  [phone]  Office:  [phone]    
[facility]  General Cardiology Progress Note    CC: chest pain, uncontrolled HTN  HPI:     S: UP in chair eating breakfast and wife at bedside. He denies cp, sob, LH/dizziness, headache or vision changes.     Tele: Sinus     O:  Physical Exam:  BP (!) 154/83   Pulse 66   Temp 97.4 °F (36.3 °C) (Oral)   Resp 18   Ht 1.905 m (6' 3\")   Wt 84.8 kg (186 lb 15.2 oz)   SpO2 98%   BMI 23.37 kg/m²    General (appearance):  No acute distress  Eyes: anicteric   Neck: soft, No JVD  Ears/Nose/Mouth/Thorat: No cyanosis  CV: RRR   Respiratory:  normal effort  GI: soft, non-tender, non-distended  Skin: Warm, dry. No rashes  Neuro/Psych: Alert and cooperative. Appropriate behavior  Ext:  No c/c.   Pulses:  2+ radial     I.O's= -4.2 L     Weight  Admission: Weight - Scale: 84.2 kg (185 lb 9.6 oz)   Today: Weight - Scale: 84.8 kg (186 lb 15.2 oz)    CBC:   Recent Labs     03/09/24  1452 03/11/24  0444   WBC 8.9 7.7   HGB 14.5 13.5   HCT 43.5 40.0*   MCV 86.5 86.8    211     BMP:   Recent Labs     03/10/24  0723 03/11/24  0444 03/12/24  0531   * 135* 134*   K 4.4 4.7 4.8   CL 97* 98* 97*   CO2 25 25 27   PHOS  --  4.7 4.2   BUN 19 41* 37*   CREATININE 2.2* 2.4* 2.5*     Estimated Creatinine Clearance: 36 mL/min (A) (based on SCr of 2.5 mg/dL (H)).    Mag:   Lab Results   Component Value Date/Time    MG 1.80 03/08/2024 03:53 PM     LIVER PROFILE:   Recent Labs     03/09/24  1452 03/10/24  0723   AST 15 15   ALT 14 12   BILITOT 0.4 0.6   ALKPHOS 77 75     PT/INR: No results for input(s): \"PROTIME\", \"INR\" in the last 72 hours.  Pro-BNP: No results found for: \"PROBNP\"  High Sensitivity Troponin:   Lab Results   Component Value Date    TROPHS 25 (H) 03/08/2024       Imaging:    Assessment:  Chest pain; non-cardiac  Uncontrolled HTN  LANCE on CKD    Plan:  -Keep K>4, Mg>2.  -Coreg 25 mg po bid  -Clonidine 0.2 mg patch weekly  -Nifedipine 60 mg po daily   -Valsartan stopped d/t worsening renal function per 
Physical Therapy  Facility/Department: [address]  Physical Therapy Daily Treatment Note    Name: Joe Wood  : 1960  MRN: [id number]  Date of Service: 3/18/2024    Discharge Recommendations:  IP Rehab, Patient able to tolerate 3hrs of therapy a day   PT Equipment Recommendations  Other: defer      Patient Diagnosis(es): The encounter diagnosis was Chest pain, unspecified type.  Past Medical History:  has no past medical history on file.  Past Surgical History:  has no past surgical history on file.    Assessment   Assessment: Pt more lethargic, less talkative, flushed and with more R UE weakness as compared to other day per wife.  Wife is concerned pt has had another neurological event.  Nursing notified of all complaints.  Pt able to walk into [place] using wheeled walker and assist of 2.  Pt leaning laterally at times and with a forward flexed trunk.  Pt presents as a fall risk.  Pt performed B LE exercises with cues for trying to count out loud.  Rec inpt PT at d/c to maximize pt mobility and safety.  Pt set up for lunch in chair with wife and OT present.  Will follow.    At baseline this patient was independent prior to CVA with functional mobility & ADL's. The current hospitalization has resulted in the patient now being limited with all aspects of mobility, negatively impacting the patient's functional independence, ability to safely access their home environment, and ability to complete valued daily tasks and life roles. Joe Wood is motivated for recovery and demonstrates the ability to tolerate inpatient rehabilitation 3 hours/day, 5 days/week for optimal return to functional independence, quality of life, and decreased caregiver burden.     Treatment Diagnosis: gait difficulty  Therapy Prognosis: Good  Decision Making: Medium Complexity  Requires PT Follow-Up: Yes  Activity Tolerance  Activity Tolerance: Patient limited by fatigue;Patient limited by endurance;Treatment limited secondary to 
Physical Therapy  Facility/Department: [facility]  Daily Treatment Note  NAME: Joe Wood  : 1960  MRN: [id number]    Date of Service: 3/13/2024    Discharge Recommendations:  IP Rehab, Patient able to tolerate 3hrs of therapy a day   PT Equipment Recommendations  Other: defer    Patient Diagnosis(es): The encounter diagnosis was Chest pain, unspecified type.    Pt hx: Pt admitted with Chest pain/uncontrolled hypertension from [facility]. Chest pain noncardiac in nature, EKG unchanged. Stroke 9 mo prior     Assessment   Assessment: pt tolerated session well progressing with mobility and demonstrating good motivation to participate and continue to promote recovery. pt performing bed mobility with min A, progressing to mod A of 1 with transfers at , and performing short distance ambulation at  with mod A of 2. pt continues to be limited by cognition, standing balance, and R sided deficits from stroke 9 mo prior. pt needing step by step cues for task completion and improvement of gait mechanics. pt well below baseline and remains a high fall risk and unsafe to perform mobility alone. Feel patient has ability to make further progress from stroke and would benefit from continued intensive IP PT at [place] to maximize independence in the hopes of returning home with wife. Continue PT POC  Activity Tolerance: Patient tolerated treatment well;Patient limited by endurance  Other: defer     Plan    Physical Therapy Plan  General Plan:  (2-5)  Current Treatment Recommendations: Strengthening;Safety education & training;Balance training;Endurance training;Functional mobility training;Transfer training;Gait training;Stair training;Home exercise program;Therapeutic activities;Wheelchair mobility training;Patient/Caregiver education & training     Restrictions  Position Activity Restriction  Other position/activity restrictions: up as tolerated     Subjective    Subjective  Subjective: pt supine in bed and 
Pt A&O x2-3, intermittently oriented/ disoriented, difficult to assess due to aphasia.  VSS, pt denies any pain this shift.  Neuro checks remain unchanged, no acute events noted this shift.    Pt is tolerating PO diet and fluids well, voiding adequately via external catheter.      Pt is currently resting in bed with all fall and safety precautions in place, bed locked and in lowest position, call light and belongings within reach.  Pt denies further needs at this time.  
Pt discharged to [facility] in good condition.  Iv and tele removed.  AVS provided to ems     Electronically signed by Ca Jane RN on 3/19/2024 at 4:49 PM    
Pt is A&O X3, vitals stable NO complaints of pain at this time, Q 2hr turns, No skin issues noted. No questions or concerns at this time. Call light, bedside table and personal items in reach. Bed locked, Alarm set, and in lowest position. Will continue to monitor.                 
Pt is looking forward to transfer to rehab facility. Pt has been in and out of hospital care since stroke in July due to recurring infections, which has been difficult for both pt and his spouse. They are hopeful about his continued recovery. I offered a prayer for his continued healing.     Medina Rodríguez   Intern     03/19/24 1445   Encounter Summary   Encounter Overview/Reason  Initial Encounter   Service Provided For: Patient and family together   Referral/Consult From: [facility]   Support System Spouse   Last Encounter  03/19/24   Complexity of Encounter Low   Begin Time 1433   End Time  1445   Total Time Calculated 12 min   Rituals, Rites and Sacraments   Type Blessings   Assessment/Intervention/Outcome   Assessment Coping;Hopeful;Impaired social interaction   Intervention Active listening;Discussed illness injury and it’s impact;Empowerment;Nurtured Hope;Prayer (assurance of)/[place];Sustaining Presence/Ministry of presence   Outcome Comfort;Coping;Engaged in conversation;Encouraged;Expressed Gratitude;Receptive       
Pt out of room to echo  
Pt was alert, talkative, and feeling well this morning. Around 10am, face became flushed and pt was diaphoretic, VSS. Wife noted pt to be confused with increased lip twitching and spacing out. Per wife, pt has known chronic seizures and the lip twitching is not new, just worsened today. Notified MD who assessed pt at bedside. Ct head ordered.  
SLP Attempt Note    SLP attempted to see pt this date. Pt with /99. Pt expressed feeling nauseous and politely declined PO trials. Wife and pt requesting SLP return later this date to complete swallowing and speech/cognitive assessment. SLP will follow-up as schedule allows and as is medically appropriate.       Attempt #2: SLP attempted to see pt for a second time. Pt off the floor for radiology. Per nursing report, pt was still not feeling well. SLP will follow-up as schedule allows and as is medically appropriate with next attempt likely 3/10.    Sofya Shahid MS, CCC-SLP  Speech-Language Pathologist  [id number]    
Speech Language Pathology  Attempt Note    SLP attempted to see pt for follow up dysphagia treatment. Upon entry, pt's spouse expressing increased fatigue this AM and requesting allowance of pt to rest. SLP stated will re-attempt later this date, as appropriate. Spouse in agreement.     Electronically signed by:  Steph Castaneda M.A., CCC-SLP  [id number]  Speech-Language Pathologist  Pg #: [phone]  
Speech Language Pathology  Contact Note    Pt and wife seen at bedside to discuss completion of instrumental swallow assessment. SLP provided education of rationale for MBS>FEES 2/2 most recent MBS study findings on 2/14/24 and use of strategies recommended. Pt and wife in agreement and demonstrated understanding. Expressed will place order for MBS to be completed this date. Relayed information to RN.     Electronically signed by:  Steph Castaneda M.A., CCC-SLP  [id number]  Speech-Language Pathologist  Pg #: [phone]  
[facility] - Acute Rehab Unit   After review, this patient is felt to be:       []  Dr. Baldwin states this patient is appropriate for rehab.     []  Not appropriate for Acute Inpatient Rehab    [x]  Referral received and ARU reviewing patient      Pt already accepted at [facility] per . [facility] ARU to sign off.   Will notify CM/SW with further updates. Thank you for the referral.    Paris MARROQUIN, OTR/L  Clinical Liaison- [facility]   (P): [phone]  (F): [phone]   
[facility]  General Cardiology Progress Note    CC: chest pain, uncontrolled HTN  HPI:     S: No cp or sob.     Tele: Sinus     O:  Physical Exam:  BP (!) 147/85   Pulse 65   Temp 97.4 °F (36.3 °C) (Oral)   Resp 16   Ht 1.905 m (6' 3\")   Wt 81.7 kg (180 lb 1.9 oz)   SpO2 94%   BMI 22.51 kg/m²    General (appearance):  No acute distress  Eyes: anicteric   Neck: soft, No JVD  Ears/Nose/Mouth/Thorat: No cyanosis  CV: RRR   Respiratory:  normal effort  GI: soft, non-tender, non-distended  Skin: Warm, dry. No rashes  Neuro/Psych: Alert and cooperative. Appropriate behavior  Ext:  No c/c.   Pulses:  2+ radial     I.O's= -5.7 L     Weight  Admission: Weight - Scale: 84.2 kg (185 lb 9.6 oz)   Today: Weight - Scale: 81.7 kg (180 lb 1.9 oz)    CBC:   No results for input(s): \"WBC\", \"HGB\", \"HCT\", \"MCV\", \"PLT\" in the last 72 hours.    BMP:   Recent Labs     24  0959 24  0505 03/15/24  0417    138 136   K 4.0 4.0 4.1   CL 97* 100 97*   CO2 25 29 27   PHOS 3.7 4.2 4.3   BUN 31* 32* 36*   CREATININE 2.4* 2.6* 2.5*     Estimated Creatinine Clearance: 35 mL/min (A) (based on SCr of 2.5 mg/dL (H)).    Mag:   Lab Results   Component Value Date/Time    MG 1.80 2024 03:53 PM     High Sensitivity Troponin:   Lab Results   Component Value Date    TROPHS 25 (H) 2024       Imagin2024 Echo  - Left ventricle: The cavity size is normal. Wall thickness was increased in     a pattern of severe LVH. Systolic function is normal. The estimated     ejection fraction is 60-65%. Wall motion is normal; there are no regional     wall motion abnormalities. There is a mild intracavitary obstruction     gradient with a peak velocity in the range of 2.9 m/s. Grade I diastolic     dysfunction.   - Aortic valve: There is mild thickening. There is mild calcification.   - Mitral valve: The annulus is calcified.   - Left atrium: No obvious right to left shunt with saline contrast, but     technically 
  Social/Functional History  Social/Functional History  Type of Home: Facility ([facility])  ADL Assistance: Needs assistance  Ambulation Assistance: Needs assistance  Transfer Assistance: Needs assistance  Additional Comments: pt has been in and out of SNF since DC from [facility] last July after CVA. In SNF pt has been ambulating/transferring with 2 person assist. Pt last home (at his son's house) in [facility] with 1st floor setup, 6 MATT. Has access to RW, WC, hospital bed, transport chair.       Objective   Safety Devices  Type of Devices: Left in chair;Nurse notified;Call light within reach;Chair alarm in place;Gait belt (Wife present)  Balance  Standing - Dynamic: Constant support (posterior lean during transfer)        ADL  LE Dressing:  (to adjust socks)  Toileting:  (External catheter in place)  Skin Care: Bath wipes;Protective barrier     Activity Tolerance  Activity Tolerance: Patient limited by endurance;Patient limited by fatigue  Bed mobility  Supine to Sit: Moderate assistance  Scooting: Minimal assistance  Transfers  Stand Step Transfers: 2 Person assistance (mod assist bed to chair with wheeled walker.)     Cognition  Overall Cognitive Status: Exceptions  Arousal/Alertness: Delayed responses to stimuli  Following Commands: Follows one step commands with repetition;Follows one step commands with increased time  Attention Span: Attends with cues to redirect  Memory: Decreased recall of biographical Information;Decreased recall of recent events  Safety Judgement: Decreased awareness of need for assistance;Decreased awareness of need for safety  Problem Solving: Decreased awareness of errors  Insights: Decreased awareness of deficits  Initiation: Requires cues for some  Sequencing: Requires cues for some  Cognition Comment: mild confusion noted throughout with delayed responses  Orientation  Overall Orientation Status: Impaired  Orientation Level: Oriented to person                  Education Given To: 
CVA.  Family / Caregiver Present: Yes (Wife)  Referring Practitioner: Dr. Velasquez  Diagnosis: Chest pain  Subjective  Subjective: Pt in bed upon entry.   Pt agreeable to activity.  Pain: 0/10     Social/Functional History  Social/Functional History  Type of Home: Facility ([facility])  ADL Assistance: Needs assistance  Ambulation Assistance: Needs assistance  Transfer Assistance: Needs assistance  Additional Comments: pt has been in and out of SNF since DC from [facility] last July after CVA. In SNF pt has been ambulating/transferring with 2 person assist. Pt last home (at his son's house) in [facility] with 1st floor setup, 6 MATT. Has access to RW, WC, hospital bed, transport chair.       Objective      Safety Devices  Type of Devices: Left in chair;Nurse notified;Call light within reach;Chair alarm in place (Wife present.  Meal tray in place.)  Balance  Sitting: High guard (SBA at EOB, intermittent cues for anterior lean)  Standing: Impaired (mod A x2 with walker, strong posterior lean.)     AROM: Within functional limits  Strength: Generally decreased, functional (Rt UE, Lft UE WFL)  Coordination: Generally decreased, functional (Rt UE, Lft UE WFL)  ADL  Grooming: Setup;Verbal cueing (to wipe face, seated in chair)  UE Bathing: Setup;Stand by assistance (to wipe underarms and apply deoderant)  LE Bathing: Maximum assistance  LE Dressing: Maximum assistance  Toileting:  (External catheter in place)     Activity Tolerance  Activity Tolerance: Patient tolerated evaluation without incident;Patient tolerated treatment well  Bed mobility  Supine to Sit: Moderate assistance (A at trunk, HOB partially elevated, use of rail)  Scooting: Contact guard assistance  Transfers  Stand Step Transfers: 2 Person assistance (mod assist of 2 with walker, bed to chair)  Sit to stand: 2 Person assistance (mod assist of 2 from bed and min assist from chair)  Stand to sit: 2 Person assistance (mod assist of 2 to min assist)  Transfer 
General: No focal deficit present.      Mental Status: He is alert.         LABS:    CBC:   Recent Labs     03/09/24  0456 03/09/24 1452 03/11/24 0444   WBC 7.2 8.9 7.7   HGB 13.6 14.5 13.5   HCT 39.6* 43.5 40.0*    282 211   MCV 85.9 86.5 86.8     Renal:    Recent Labs     03/08/24  1553 03/09/24  0456 03/09/24  1452 03/10/24  0723 03/11/24 0444   *   < > 130* 133* 135*   K 4.4   < > 4.6 4.4 4.7   CL 93*   < > 93* 97* 98*   CO2 25   < > 24 25 25   BUN 20   < > 19 19 41*   CREATININE 1.9*   < > 1.9* 2.2* 2.4*   GLUCOSE 97   < > 107* 105* 97   CALCIUM 9.5   < > 9.3 9.4 9.1   MG 1.80  --   --   --   --    PHOS  --   --   --   --  4.7   ANIONGAP 11   < > 13 11 12    < > = values in this interval not displayed.     Hepatic:   Recent Labs     03/09/24  1452 03/10/24  0723 03/11/24 0444   AST 15 15  --    ALT 14 12  --    BILITOT 0.4 0.6  --    PROT 7.5 7.1  --    LABALBU 4.1 4.0 3.9   ALKPHOS 77 75  --      Troponin: No results for input(s): \"TROPONINI\" in the last 72 hours.  BNP: No results for input(s): \"BNP\" in the last 72 hours.  Lipids: No results for input(s): \"CHOL\", \"HDL\" in the last 72 hours.    Invalid input(s): \"LDLCALCU\", \"TRIGLYCERIDE\"  ABGs:  No results for input(s): \"PHART\", \"SJU4AMA\", \"PO2ART\", \"AXM8GDV\", \"BEART\", \"THGBART\", \"F4ZBRERZ\", \"RWL4CHL\" in the last 72 hours.    INR: No results for input(s): \"INR\" in the last 72 hours.  Lactate: No results for input(s): \"LACTATE\" in the last 72 hours.  Cultures:  -----------------------------------------------------------------  RAD:   XR CHEST PORTABLE   Final Result      1.  No acute disease.         XR ABDOMEN (KUB) (SINGLE AP VIEW)   Final Result      1.  Nonobstructive bowel gas pattern. No acute abdominal abnormality.         XR CHEST PORTABLE   Final Result      1.  No acute disease.         FL MODIFIED BARIUM SWALLOW W VIDEO    (Results Pending)       Assessment/Plan:     Chest pain/uncontrolled hypertension  -Chest pain is 
Soft.   Other relevant findings:       Medications:       lamoTRIgine  100 mg Oral BID    hydrALAZINE  50 mg Oral 3 times per day    NIFEdipine  60 mg Oral Daily    tamsulosin  0.4 mg Oral Daily    carvedilol  25 mg Oral BID    cloNIDine  1 patch TransDERmal Weekly    sodium chloride flush  5-40 mL IntraVENous 2 times per day    enoxaparin  40 mg SubCUTAneous Daily    DULoxetine  30 mg Oral Daily         Labs:     Lab Results   Component Value Date    CREATININE 2.1 (H) 03/18/2024    BUN 36 (H) 03/18/2024     03/18/2024    K 4.0 03/18/2024     03/18/2024    CO2 28 03/18/2024    CALCIUM 9.5 03/18/2024    PHOS 4.7 03/16/2024     Lab Results   Component Value Date    WBC 5.6 03/18/2024    HGB 13.2 (L) 03/18/2024    HCT 39.5 (L) 03/18/2024    MCV 87.9 03/18/2024     03/18/2024         Past Medical History:     History reviewed. No pertinent past medical history.    Past Surgical History:     History reviewed. No pertinent surgical history.                
kidney disease stage III was admitted with chest pain and nephrology is consulted to assist in BP control.       Patient seen at bedside with his wife and appear comfortable. Unable to speak properly due to H/O CVA. Chest pain is better. Denied SOB and on room air. Per wife patient appear little confused ? And he has recurrent UTIs and whenever he has UTI he is more confused.         ROS:   Negative except as mentioned in HPI      Vitals:     Vitals:    03/17/24 0526   BP: (!) 154/78   Pulse:    Resp:    Temp:    SpO2:          Physical Examination:     General appearance: NAD. Alert, oriented  Respiratory: No respiratory distress on room air.   Cardiovascular: Edema none.   Abdomen: Soft.   Other relevant findings:       Medications:       cefTRIAXone (ROCEPHIN) IV  1,000 mg IntraVENous Q24H    hydrALAZINE  25 mg Oral 3 times per day    NIFEdipine  60 mg Oral Daily    tamsulosin  0.4 mg Oral Daily    carvedilol  25 mg Oral BID    cloNIDine  1 patch TransDERmal Weekly    sodium chloride flush  5-40 mL IntraVENous 2 times per day    enoxaparin  40 mg SubCUTAneous Daily    lamoTRIgine  75 mg Oral BID    DULoxetine  30 mg Oral Daily         Labs:     Lab Results   Component Value Date    CREATININE 2.3 (H) 03/17/2024    BUN 41 (H) 03/17/2024     03/17/2024    K 3.8 03/17/2024    CL 97 (L) 03/17/2024    CO2 29 03/17/2024    CALCIUM 9.5 03/17/2024    PHOS 4.7 03/16/2024     Lab Results   Component Value Date    WBC 5.6 03/17/2024    HGB 13.3 (L) 03/17/2024    HCT 40.2 (L) 03/17/2024    MCV 88.8 03/17/2024     03/17/2024         Past Medical History:     History reviewed. No pertinent past medical history.    Past Surgical History:     History reviewed. No pertinent surgical history.                
oriented  Respiratory: No respiratory distress on room air.   Cardiovascular: Edema none.   Abdomen: Soft.   Other relevant findings:       Medications:       hydrALAZINE  25 mg Oral 3 times per day    NIFEdipine  60 mg Oral Daily    tamsulosin  0.4 mg Oral Daily    carvedilol  25 mg Oral BID    cloNIDine  1 patch TransDERmal Weekly    sodium chloride flush  5-40 mL IntraVENous 2 times per day    enoxaparin  40 mg SubCUTAneous Daily    lamoTRIgine  75 mg Oral BID    DULoxetine  30 mg Oral Daily         Labs:     Lab Results   Component Value Date    CREATININE 2.5 (H) 03/12/2024    BUN 37 (H) 03/12/2024     (L) 03/12/2024    K 4.8 03/12/2024    CL 97 (L) 03/12/2024    CO2 27 03/12/2024    CALCIUM 10.1 03/12/2024    PHOS 4.2 03/12/2024     Lab Results   Component Value Date    WBC 7.7 03/11/2024    HGB 13.5 03/11/2024    HCT 40.0 (L) 03/11/2024    MCV 86.8 03/11/2024     03/11/2024         Past Medical History:     History reviewed. No pertinent past medical history.    Past Surgical History:     History reviewed. No pertinent surgical history.                
respiratory distress on room air.   Cardiovascular: Edema none.   Abdomen: Soft.   Other relevant findings:       Medications:       carvedilol  25 mg Oral BID    cloNIDine  1 patch TransDERmal Weekly    [Held by provider] valsartan  320 mg Oral Daily    amLODIPine  10 mg Oral Daily    sodium chloride flush  5-40 mL IntraVENous 2 times per day    enoxaparin  40 mg SubCUTAneous Daily    lamoTRIgine  75 mg Oral Nightly    lamoTRIgine  75 mg Oral BID    DULoxetine  30 mg Oral Daily         Labs:     Lab Results   Component Value Date    CREATININE 2.4 (H) 03/11/2024    BUN 41 (H) 03/11/2024     (L) 03/11/2024    K 4.7 03/11/2024    CL 98 (L) 03/11/2024    CO2 25 03/11/2024    CALCIUM 9.1 03/11/2024    PHOS 4.7 03/11/2024     Lab Results   Component Value Date    WBC 7.7 03/11/2024    HGB 13.5 03/11/2024    HCT 40.0 (L) 03/11/2024    MCV 86.8 03/11/2024     03/11/2024         Past Medical History:     History reviewed. No pertinent past medical history.    Past Surgical History:     History reviewed. No pertinent surgical history.                
     ADL  Feeding: Setup;Increased time to complete;Bringing food to mouth assist  Feeding Skilled Clinical Factors: Pt's wife reporting pt is not using RUE as he typically would. Pt noted to have increased difficulty scooping food w/ R hand and bringing food to mouth  LE Dressing: Moderate assistance  LE Dressing Skilled Clinical Factors: assist to thread BLE into brief. pt pulled brief up over knees w/ Min A for sitting balance. Pt pulled briefs up over hips in stance w/ Min A for standing balance and A for thoroughness w/ R side  Toileting Skilled Clinical Factors: External catheter in place     Activity Tolerance  Activity Tolerance: Patient limited by fatigue;Patient limited by endurance;Treatment limited secondary to decreased cognition  Bed mobility  Supine to Sit: 2 Person assistance;Moderate assistance;Minimal assistance  Scooting: Minimal assistance (At EOB)     Vision  Vision: Within Functional Limits  Hearing  Hearing: Within functional limits  Cognition  Overall Cognitive Status: Exceptions  Arousal/Alertness: Delayed responses to stimuli  Following Commands: Follows one step commands with repetition;Follows one step commands with increased time  Attention Span: Attends with cues to redirect  Memory: Decreased recall of biographical Information;Decreased recall of recent events  Safety Judgement: Decreased awareness of need for assistance;Decreased awareness of need for safety  Problem Solving: Decreased awareness of errors  Insights: Decreased awareness of deficits  Initiation: Requires cues for some  Sequencing: Requires cues for some  Orientation  Overall Orientation Status: Impaired  Orientation Level: Oriented to person;Disoriented to place                  Education Given To: Patient;Family  Education Provided: Role of Therapy;Plan of Care;Transfer Training;ADL Adaptive Strategies  Education Method: Verbal;Demonstration  Barriers to Learning: Cognition  Education Outcome: Continued education 
Constant support (posterior lean during transfer)  Bed mobility  Supine to Sit: Moderate assistance  Scooting: Minimal assistance  Transfers  Sit to Stand: 2 Person Assistance;Minimal Assistance  Stand to Sit: Moderate Assistance  Comment: VC for hand placement, sequencing and safety.  Ambulation  Surface: Level tile  Device: Rolling Walker  Assistance: 2 Person assistance;Moderate assistance  Quality of Gait: slow and effortful steps. RLE shuffles/drags with R foot eversion noted at times.  Gait Deviations: Increased LOLA;Staggers;Shuffles;Decreased step height  Distance: 3'  Comments: fatigues quickly, cues for inc step length RLE          AM-PAC - Mobility  -PAC Basic Mobility - Inpatient   How much help is needed turning from your back to your side while in a flat bed without using bedrails?: A Little  How much help is needed moving from lying on your back to sitting on the side of a flat bed without using bedrails?: A Little  How much help is needed moving to and from a bed to a chair?: Total  How much help is needed standing up from a chair using your arms?: A Lot  How much help is needed walking in hospital room?: Total  How much help is needed climbing 3-5 steps with a railing?: Total  AM-PAC Inpatient Mobility Raw Score : 11  AM-PAC Inpatient T-Scale Score : 33.86  Mobility Inpatient CMS 0-100% Score: 72.57  Mobility Inpatient CMS G-Code Modifier : CL    Goals  Short Term Goals  Time Frame for Short Term Goals: discharge - all goals ongoing 3/15  Short Term Goal 1: pt will perform supine>sit IND  Short Term Goal 2: pt will perf STS to RW with CGA  Short Term Goal 3: pt will amb 50ft with RW and CGA  Short Term Goal 4: pt will perf SPT With RW CGA       Education  Patient Education  Education Given To: Patient;Family  Education Provided: Role of Therapy;Transfer Training;Family Education;Equipment;Energy Conservation;Plan of Care;Home Exercise Program  Education Method: Verbal  Barriers to Learning: 
at [facility] for rehab over the last couple of weeks and according to the wife who provided most of the information has been struggling with controlling blood pressure, when patient woke up this morning he ate his breakfast but then when physical therapy started to work with the patient and his blood pressure was elevated so they canceled the treatment, patient went back to his room and he took a nap and did not eat lunch which according to the wife is not normal for him, when he woke up he complained of chest pain it was on the mid chest but no radiation to arm or neck lasted few hours it was severe in nature by the time he arrived to the emergency room his symptoms started to improve.  But also the wife noticed that the patient is more flushed today with his face being red than usual, he had a systolic blood pressure at times up to 170 today and she had noticed recently that there is a good 20 point difference between his left and right upper extremity when checking his blood pressure.  Seen patient has been eating and drinking okay.  He denied having any nausea no vomiting no worsening of the weakness in the right upper extremity/lower extremity.  No urinary symptoms.     Subjective: Patient is alert today wife at bedside no specific complaints.  Pertinent positives and negatives discussed in HPI    Objective:     Intake/Output Summary (Last 24 hours) at 3/14/2024 1041  Last data filed at 3/14/2024 0531  Gross per 24 hour   Intake 180 ml   Output 750 ml   Net -570 ml        Vitals:   Vitals:    03/14/24 0025 03/14/24 0517 03/14/24 0531 03/14/24 0849   BP: 119/75 (!) 158/92  (!) 141/77   Pulse: 63 69  64   Resp: 18 16  18   Temp: 97.3 °F (36.3 °C) 97.1 °F (36.2 °C)  97.7 °F (36.5 °C)   TempSrc: Oral Axillary  Temporal   SpO2: 96% 96%  96%   Weight:   81.9 kg (180 lb 8.9 oz)    Height:             Physical Exam:      General: Chronically ill-appearing male   Up in a chair  eyes: Clear nonicteric 
discussed. The note was completed using EMR. Every effort was made to ensure accuracy; however, inadvertent computerized transcription errors may be present. I have personally reviewed the reports and images of labs, radiological studies, cardiac studies including ECG's and telemetry, current and old medical records.     Celestino Jc MD, FACC, [facility]  Advanced Cardiac Imaging  [facility]    3/10/2024 9:35 AM      
    Education  Patient Education  Education Given To: Patient;Family  Education Provided: Role of Therapy;Transfer Training;Family Education;Equipment;Energy Conservation;Plan of Care  Education Method: Verbal  Barriers to Learning: None  Education Outcome: Verbalized understanding;Continued education needed      Therapy Time   Individual Concurrent Group Co-treatment   Time In 0830         Time Out 0924         Minutes 54               Timed Code Treatment Minutes: 39    Total Treatment Minutes: 54    Whitley Atkins PT, DPT, NCS, CSRS       
PRN  sodium chloride flush, 5-40 mL, PRN  sodium chloride, , PRN  potassium chloride, 40 mEq, PRN   Or  potassium alternative oral replacement, 40 mEq, PRN   Or  potassium chloride, 10 mEq, PRN  magnesium sulfate, 2,000 mg, PRN  ondansetron, 4 mg, Q8H PRN   Or  ondansetron, 4 mg, Q6H PRN  polyethylene glycol, 17 g, Daily PRN  acetaminophen, 650 mg, Q6H PRN   Or  acetaminophen, 650 mg, Q6H PRN        Labs and Imaging   XR CHEST PORTABLE    Result Date: 3/8/2024  EXAM: XR CHEST PORTABLE INDICATION: Chest Pain, COMPARISON: none FINDINGS: SUPPORT DEVICES: None HEART / MEDIASTINUM: Normal in size. LUNGS/PLEURA: Lungs are clear. No evidence of pneumothorax. BONES / SOFT TISSUES: No acute abnormality. OTHER: None.     1.  No acute disease.       CBC:   Recent Labs     03/08/24  1553 03/09/24  0456 03/09/24  1452   WBC 7.2 7.2 8.9   HGB 14.0 13.6 14.5    257 282       BMP:    Recent Labs     03/08/24  1553 03/09/24  0456 03/09/24  1452   * 133* 130*   K 4.4 4.2 4.6   CL 93* 96* 93*   CO2 25 25 24   BUN 20 18 19   CREATININE 1.9* 1.9* 1.9*   GLUCOSE 97 101* 107*       Hepatic:   Recent Labs     03/09/24  1452   AST 15   ALT 14   BILITOT 0.4   ALKPHOS 77     Lipids: No results found for: \"CHOL\", \"HDL\", \"TRIG\"  Hemoglobin A1C: No results found for: \"LABA1C\"  TSH: No results found for: \"TSH\"  Troponin: No results found for: \"TROPONINT\"  Lactic Acid: No results for input(s): \"LACTA\" in the last 72 hours.  BNP: No results for input(s): \"PROBNP\" in the last 72 hours.  UA:  Lab Results   Component Value Date/Time    NITRU Negative 03/09/2024 06:34 PM    COLORU Yellow 03/09/2024 06:34 PM    PHUR 7.5 03/09/2024 06:34 PM    WBCUA None seen 03/09/2024 06:34 PM    RBCUA None seen 03/09/2024 06:34 PM    CLARITYU Clear 03/09/2024 06:34 PM    SPECGRAV 1.015 03/09/2024 06:34 PM    LEUKOCYTESUR Negative 03/09/2024 06:34 PM    UROBILINOGEN 0.2 03/09/2024 06:34 PM    BILIRUBINUR Negative 03/09/2024 06:34 PM    BLOODU Negative 
putting on and taking off regular upper body clothing?: A Little  How much help is needed for taking care of personal grooming?: A Little  How much help for eating meals?: A Little  AM-PAC Inpatient Daily Activity Raw Score: 15  AM-PAC Inpatient ADL T-Scale Score : 34.69  ADL Inpatient CMS 0-100% Score: 56.46  ADL Inpatient CMS G-Code Modifier : CK    Goals  Short Term Goals  Time Frame for Short Term Goals: Discharge - all ongoing  Short Term Goal 1: Transfer to/from 3 in 1 commode with min assist  Short Term Goal 2: Lower body dressing with min assist  Short Term Goal 3: Stance at walker with min assist x3 mmin to assist with ADL  Patient Goals   Patient goals : Not stated       Therapy Time   Individual Concurrent Group Co-treatment   Time In 1338         Time Out 1431         Minutes 53         Timed Code Treatment Minutes: 53 Minutes       Isiah Strong, OT     
focus on swallow function at this time. Will continue to monitor.      Prognosis:  Prognosis for improvement: fair  Barriers to reach goals: hx of dysphagia, cognitive impairment which impacts ability to participate in swallow ex/tx and compensatory strategies.    Treatment:  Dysphagia Goals:  The pt will be seen  2-3 x to address the following goals:   Goal 1: Patient will tolerate least restrictive diet with adequate oral prep and without overt signs of aspiration or associated decline in respiratory status.  3/12:  Pt seen for dysphagia f/u. Pt seated upright in chair. Pt spouse present and reviewed MBS results and recommendations and verbalized understanding.   Pt seen with thin liquids via cup and straw. Noted cough x 1 with thin liquids via straw and x 1 with larger sip. No further s/sx of aspiration noted with small sips of thin liquids. Encouraged chin tuck and double swallow. Pt requires ongoing cues to perform. Pt consumed meds with nursing whole in puree without difficulties. Recommend cont with current po diet with strict aspiration prec in place. Will cont to f/u to ensure po tolerance. Edu pt/spouse/nurse if pt demonstrates any s/sx of aspiration d/c thin liquids, resume mildly thick liquids and contact SLP  Cont goal  Goal 2: Patient/caregiver will demonstrate understanding of dysphagia recommendations/concerns.  3/10: Education completed with spouse and RN regarding diet/liquid recommendations, swallow strategies (chin tuck) and thickening instructions. Education completed with spouse regarding POC including FEES to determine fatigue affect on swallow. Pt and spouse agreeable to plan for FEES. RN notified of recommendations. ST assisted RN in contacting MD for diet order and FEES orders.  3/12: Pts spouse present re-edu re: MBS and current diet and recommendations. Spouse demonstrated understanding.   Cont goal   Goal 3: Patient will participate in instrumental assessment (FEES) of swallow function 
having any nausea no vomiting no worsening of the weakness in the right upper extremity/lower extremity.  No urinary symptoms. \"        Subjective: Patient is alert,  wife at bedside  Wife has noted intermittent episodes of confusion  Continues to have seizures involving the right lower face/jaw.      Pertinent positives and negatives discussed in HPI    Objective:     Intake/Output Summary (Last 24 hours) at 3/15/2024 0912  Last data filed at 3/15/2024 0830  Gross per 24 hour   Intake 900 ml   Output 2180 ml   Net -1280 ml        Vitals:   Vitals:    03/14/24 1935 03/15/24 0005 03/15/24 0435 03/15/24 0821   BP: 130/76 128/75 125/69 (!) 147/85   Pulse: 76 67 68 65   Resp: 18 16 18 16   Temp: 97.6 °F (36.4 °C) 97.3 °F (36.3 °C) 97.4 °F (36.3 °C) 97.4 °F (36.3 °C)   TempSrc: Oral Oral Oral Oral   SpO2: 96% 96% 96% 94%   Weight:   81.7 kg (180 lb 1.9 oz)    Height:             Physical Exam:      General: Chronically ill-appearing male   Up in a chair  eyes: Clear nonicteric   ENT: neck supple trach midline  Cardiovascular: Regular rate and rhythm  Respiratory: Clear to auscultation  Gastrointestinal: Soft, non tender  BS Positive  Genitourinary: no suprapubic tenderness  Musculoskeletal:  edema none  Chronic weakness right upper and lower extremity  Skin: warm, dry  Neuro: Alert pleasant  Psych: Mood appropriate.         Medications:   Medications:    hydrALAZINE  25 mg Oral 3 times per day    NIFEdipine  60 mg Oral Daily    tamsulosin  0.4 mg Oral Daily    carvedilol  25 mg Oral BID    cloNIDine  1 patch TransDERmal Weekly    sodium chloride flush  5-40 mL IntraVENous 2 times per day    enoxaparin  40 mg SubCUTAneous Daily    lamoTRIgine  75 mg Oral BID    DULoxetine  30 mg Oral Daily      Infusions:    sodium chloride       PRN Meds: hydrALAZINE, 20 mg, Q4H PRN  albuterol, 2.5 mg, Q4H PRN  bisacodyl, 10 mg, Daily PRN  sodium chloride flush, 5-40 mL, PRN  sodium chloride, , PRN  potassium chloride, 40 mEq, PRN   
prior notes...  3/18: Educated on current risk factors associated with aspiration pneumonia and importance of appropriate oral hygiene with toothbrush/toothpaste and suction system, maximal use of strategies including use of wedge to achieve upright positioning as indicated. Spouse verbalizes comprehension. Pt only intermittently attending to SLP.   3/19: Reviewed swallow function, diet tolerance, aspiration signs/concerns, preventative measures (including importance of upright positioning during/after swallow, and small sips), as well as option to use mildly thick liquid. Pt verbalized comprehension.  Cont    Goal 3: Patient will participate in instrumental assessment (FEES) of swallow function as appropriate.  MBSGoal met D/C    Education  As above    Total treatment time: 10 dysphagia tx    Plan: Continue per POC  Recommended Diet: Soft and Bite Size Solids, Thin Liquids via small single sips   Medication administration: crushed or whole in puree     Strategies:   -1:1 feed assist/supervision  -small bites/sips  -1 single sip at a time  -effortful swallow    Discharge Recommendation: ongoing ST services at next level of care  Discussed with RNCa  Needs met prior to leaving room, call light within reach    Sandi Connolly, SLP, [id number]  Ph. # [phone]    This document will serve as a dc summary if pt dc prior to next visit